# Patient Record
Sex: MALE | Race: WHITE | HISPANIC OR LATINO | Employment: STUDENT | URBAN - METROPOLITAN AREA
[De-identification: names, ages, dates, MRNs, and addresses within clinical notes are randomized per-mention and may not be internally consistent; named-entity substitution may affect disease eponyms.]

---
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
## 2021-03-31 ENCOUNTER — TELEPHONE (OUTPATIENT)
Dept: PEDIATRIC CARDIOLOGY | Facility: CLINIC | Age: 21
End: 2021-03-31

## 2021-03-31 NOTE — TELEPHONE ENCOUNTER
Spoke with Sean and advised that if Martín is doing well and does not have chest pain or shortness of breath, he can stay at home.  If Martín has shortness of breath or chest pain, per Dr. Sharma, they should be evaluated with echo and ekg.

## 2021-05-20 DIAGNOSIS — Q23.81 BICUSPID AORTIC VALVE: ICD-10-CM

## 2021-05-20 DIAGNOSIS — Z95.2 S/P AORTIC VALVE REPLACEMENT: Primary | ICD-10-CM

## 2021-06-03 ENCOUNTER — HOSPITAL ENCOUNTER (OUTPATIENT)
Dept: CARDIOLOGY | Facility: CLINIC | Age: 21
End: 2021-06-03
Attending: PEDIATRICS
Payer: COMMERCIAL

## 2021-06-03 ENCOUNTER — OFFICE VISIT (OUTPATIENT)
Dept: PEDIATRIC CARDIOLOGY | Facility: CLINIC | Age: 21
End: 2021-06-03
Attending: PEDIATRICS
Payer: COMMERCIAL

## 2021-06-03 VITALS
BODY MASS INDEX: 34.95 KG/M2 | SYSTOLIC BLOOD PRESSURE: 138 MMHG | WEIGHT: 281.09 LBS | RESPIRATION RATE: 16 BRPM | HEIGHT: 75 IN | OXYGEN SATURATION: 99 % | DIASTOLIC BLOOD PRESSURE: 80 MMHG | HEART RATE: 80 BPM

## 2021-06-03 DIAGNOSIS — Z95.2 S/P AORTIC VALVE REPLACEMENT: Primary | ICD-10-CM

## 2021-06-03 DIAGNOSIS — Z95.2 S/P AORTIC VALVE REPLACEMENT: ICD-10-CM

## 2021-06-03 DIAGNOSIS — Q23.81 BICUSPID AORTIC VALVE: ICD-10-CM

## 2021-06-03 PROCEDURE — 93320 DOPPLER ECHO COMPLETE: CPT | Mod: 26 | Performed by: PEDIATRICS

## 2021-06-03 PROCEDURE — G0463 HOSPITAL OUTPT CLINIC VISIT: HCPCS | Mod: 25

## 2021-06-03 PROCEDURE — 93320 DOPPLER ECHO COMPLETE: CPT

## 2021-06-03 PROCEDURE — 93325 DOPPLER ECHO COLOR FLOW MAPG: CPT | Mod: 26 | Performed by: PEDIATRICS

## 2021-06-03 PROCEDURE — 93325 DOPPLER ECHO COLOR FLOW MAPG: CPT

## 2021-06-03 PROCEDURE — 93303 ECHO TRANSTHORACIC: CPT | Mod: 26 | Performed by: PEDIATRICS

## 2021-06-03 PROCEDURE — 99213 OFFICE O/P EST LOW 20 MIN: CPT | Mod: 25 | Performed by: PEDIATRICS

## 2021-06-03 RX ORDER — LOSARTAN POTASSIUM 25 MG/1
25 TABLET ORAL DAILY
Qty: 30 TABLET | Refills: 11 | Status: SHIPPED | OUTPATIENT
Start: 2021-06-03 | End: 2022-06-13

## 2021-06-03 ASSESSMENT — MIFFLIN-ST. JEOR: SCORE: 2372.5

## 2021-06-03 NOTE — PROGRESS NOTES
Pediatric Cardiology Visit    Patient:  Martín Spencer MRN:  [id number]   YOB: 2000 Age:  20 year old   Date of Visit:  Juvenal 3, 2021 PCP:  Kristina Richmond MD     Dear Kristina Subramanian MD:    We saw Martín Spencer at the [facility] on Juvenal 3, 2021 for follow-up of a bicuspid aortic valve s/p recent surgical repair.  As you know, Martín was first referred to us in 2014 for evaluation of a murmur at which time he was diagnosed with a bicuspid aortic valve with mild regurgitation for which he was started on Lisinopril.  In the interim, Martín has continued to do well without any cardiac symptoms, however he had progressive aortic insufficiency and development of left ventricular enlargement, and so decision was made to proceed with aortic valve repair vs. Ross procedure.     On 5/14/2020 he had surgery with Dr. Griselli and had aortic valve repair and aortic root replacement.  He did very well postoperatively and was discharged on 5/17/20. He denies palpitations, shortness of breath or syncope.  He did have covid in April with mild symptoms but has recovered completely.  He has had no fevers, incision healed well with no erythema or drainage.  A comprehensive review of system if otherwise normal.    Past medical history: Obesity, acanthosis nigricans.    Bicuspid aortic valve s/p repair with aortic root replacement (5/14/20, Dr. Griselli)    Current medications:  Aspirin 81mg daily  Losartan 25mg daily     Heis allergic to dilantin [phenytoin].    Family and social history: No history of acquired heart disease in the family. No sudden death. Martín has three siblings, one of whom has bicuspid aortic valve and aortic root dilation.  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 There have been no changes to  "his medical history or the family history.     Physical exam:  His height is 1.9 m (6' 2.8\") and weight is 127.5 kg (281 lb 1.4 oz). His blood pressure is 138/80 and his pulse is 80. His respiration is 16 and oxygen saturation is 99%.   His body mass index is 35.32 kg/m .  His body surface area is 2.59 meters squared.  Growth percentiles are 100 % for weight and 95 % for height.  Martín is  in no distress.  Lungs are clear with easy work of breathing.  Heart is regular with normal S1, physiologically split S2, 2/6 systolic ejection murmur in the RUSB with no diastolic murmur. Sternotomy and chest tube incisions are healing well, no erythema, suture at superior portion of sternal incision removed today.  Abdomen is soft without hepatomegaly.  Extremities are warm and well-perfused with normal and symmetric pulses.    Echocardiogram revealed:   The aortic valve is bicuspid. S/P The conjoined aortic cusp was released and a commissure narrowed. (5/14/2020). Mild (1+) aortic valve insufficiency. Mild aortic valve stenosis. The mean gradient across the aortic valve is 11 mmHg. No pericardial effusion. Normal left ventricular size and systolic function.    In summary, Martín is a 20 year old with a bicuspid aortic valve who developed severe aortic insufficiency and left ventricular enlargement, who is now after aortic valve repair and aortic root replacement (5/14/20) with excellent surgical result. His echocardiogram today shows trivial aortic insufficiency and mild aortic stenosis.     Recommendations today:  Echocardiogram stable today. Mild leakage of aortic valve.     Continue current losartan. Can discontinue aspirin.     Return to clinic in 1 year for routine followup.     Thank you for the opportunity to participate in Martín's care. Please do not hesitate to call with questions or concerns.      Diagnoses:   1. Bicuspid aortic valve        A) s/p aortic valve repair and aortic root replacement (5/14/20,  " Griselli)  2. Mild left ventricular enlargement - resolved postoperatively    Most sincerely,      Shirley Sharma MD   Pediatric Cardiology      CC  Patient Care Team:  Kristina Richmond MD as PCP - General Sharma, Shirley Vásquez MD as Assigned Pediatric Specialist Provider  Reema Frausto PA as Assigned Heart and Vascular Provider  Griselli, Massimo    Copy to patient  BRANDI YEE  [address] [facility] [address]

## 2021-06-03 NOTE — NURSING NOTE
"Chief Complaint   Patient presents with     RECHECK     s/p aortic valve replacement      Vitals:    06/03/21 0833   BP: 138/80   BP Location: Right arm   Patient Position: Chair   Cuff Size: Adult Large   Pulse: 80   Resp: 16   SpO2: 99%   Weight: 281 lb 1.4 oz (127.5 kg)   Height: 6' 2.8\" (190 cm)     Corina Fitzgerald LPN  Jenn 3, 2021  "

## 2021-06-03 NOTE — LETTER
6/3/2021      RE: Martín Spencer  [address]  [place] [address]       Pediatric Cardiology Visit    Patient:  Martín Spencer MRN:  [id number]   YOB: 2000 Age:  20 year old   Date of Visit:  Juvenal 3, 2021 PCP:  Kristina Richmond MD     Dear Kristina Subramanian MD:    We saw Martín Spencer at the [facility] on Juvenal 3, 2021 for follow-up of a bicuspid aortic valve s/p recent surgical repair.  As you know, Martín was first referred to us in 2014 for evaluation of a murmur at which time he was diagnosed with a bicuspid aortic valve with mild regurgitation for which he was started on Lisinopril.  In the interim, Martín has continued to do well without any cardiac symptoms, however he had progressive aortic insufficiency and development of left ventricular enlargement, and so decision was made to proceed with aortic valve repair vs. Ross procedure.     On 5/14/2020 he had surgery with Dr. Griselli and had aortic valve repair and aortic root replacement.  He did very well postoperatively and was discharged on 5/17/20. He denies palpitations, shortness of breath or syncope.  He did have covid in April with mild symptoms but has recovered completely.  He has had no fevers, incision healed well with no erythema or drainage.  A comprehensive review of system if otherwise normal.    Past medical history: Obesity, acanthosis nigricans.    Bicuspid aortic valve s/p repair with aortic root replacement (5/14/20, Dr. Griselli)    Current medications:  Aspirin 81mg daily  Losartan 25mg daily     Heis allergic to dilantin [phenytoin].    Family and social history: No history of acquired heart disease in the family. No sudden death. Martín has three siblings, one of whom has bicuspid aortic valve and aortic root dilation.  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 There have been no changes to his medical history or the family history.     Physical exam:  His height is 1.9 m (6' 2.8\") and weight is 127.5 kg (281 lb 1.4 oz). His blood pressure is 138/80 and his pulse is 80. His respiration is 16 and oxygen saturation is 99%.   His body mass index is 35.32 kg/m .  His body surface area is 2.59 meters squared.  Growth percentiles are 100 % for weight and 95 % for height.  Martín is  in no distress.  Lungs are clear with easy work of breathing.  Heart is regular with normal S1, physiologically split S2, 2/6 systolic ejection murmur in the RUSB with no diastolic murmur. Sternotomy and chest tube incisions are healing well, no erythema, suture at superior portion of sternal incision removed today.  Abdomen is soft without hepatomegaly.  Extremities are warm and well-perfused with normal and symmetric pulses.    Echocardiogram revealed:   The aortic valve is bicuspid. S/P The conjoined aortic cusp was released and a commissure narrowed. (5/14/2020). Mild (1+) aortic valve insufficiency. Mild aortic valve stenosis. The mean gradient across the aortic valve is 11 mmHg. No pericardial effusion. Normal left ventricular size and systolic function.    In summary, Martín is a 20 year old with a bicuspid aortic valve who developed severe aortic insufficiency and left ventricular enlargement, who is now after aortic valve repair and aortic root replacement (5/14/20) with excellent surgical result. His echocardiogram today shows trivial aortic insufficiency and mild aortic stenosis.     Recommendations today:  Echocardiogram stable today. Mild leakage of aortic valve.     Continue current losartan. Can discontinue aspirin.     Return to clinic in 1 year for routine followup.     Thank you for the opportunity to participate in Martín's care. Please do not hesitate to call with questions or concerns.      Diagnoses:   1. Bicuspid aortic " valve        A) s/p aortic valve repair and aortic root replacement (5/14/20, Dr. Griselli)  2. Mild left ventricular enlargement - resolved postoperatively    Most sincerely,      Shirely Sharma MD   Pediatric Cardiology      CC  Patient Care Team:  Kristina Richmond MD as PCP - General Sharma, Shirley Vásquez MD as Assigned Pediatric Specialist Provider  Reema Frausto PA as Assigned Heart and Vascular Provider  Griselli, Massimo    Copy to patient  BRANDI MCCLAINDO  [address]  [facility] [address]

## 2021-06-03 NOTE — PATIENT INSTRUCTIONS
[facility]  [address]  [address] [address]  [place] [facility] [id number] [phone]      Cardiology Clinic   RN Care Coordinators, Marlys Linton (Bre)  [phone]  Pediatric Call Center/Scheduling  [phone]    After Hours and Emergency Contact Number  [phone]  * Ask for the pediatric cardiologist on call         Prescription Renewals  The pharmacy must fax requests to [phone]  * Please allow 3-4 days for prescriptions to be authorized     1. Echocardiogram stable today. Mild leakage of aortic valve.   2. Continue current losartan. Can discontinue aspirin.   3. Return to clinic in 1 year for routine followup.

## 2021-06-04 ENCOUNTER — TELEPHONE (OUTPATIENT)
Dept: CARDIOLOGY | Facility: CLINIC | Age: 21
End: 2021-06-04

## 2021-06-04 DIAGNOSIS — Z95.2 S/P AORTIC VALVE REPLACEMENT: ICD-10-CM

## 2021-06-04 DIAGNOSIS — Q23.81 BICUSPID AORTIC VALVE: Primary | ICD-10-CM

## 2021-06-04 NOTE — TELEPHONE ENCOUNTER
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

## 2021-06-04 NOTE — TELEPHONE ENCOUNTER
Date: 6/4/2021    Time of Call: 11:44 AM     Diagnosis:  BAV     [ TORB ] Ordering provider: Dr. Rivera  Order: echo, labs, and EKG prior to appointment     Order received by: Theodore BRASHER     Follow-up/additional notes: Orders placed for WQ.

## 2021-06-10 ENCOUNTER — TELEPHONE (OUTPATIENT)
Dept: PEDIATRIC CARDIOLOGY | Facility: CLINIC | Age: 21
End: 2021-06-10

## 2021-06-10 NOTE — TELEPHONE ENCOUNTER
Patient called after being evaluated in urgent care. They prescribed indocin for him and he wanted Dr. Sharma's approval.     Reviewed chart and huddled with provider. We will have him hold indocin and trial 800mg advil every 8 hours. Follow up in one week.     Discussed with patient. He agrees with plan and requested visit.     Christine Linton, JOELN, RN

## 2021-06-22 ENCOUNTER — OFFICE VISIT (OUTPATIENT)
Dept: PEDIATRIC CARDIOLOGY | Facility: CLINIC | Age: 21
End: 2021-06-22
Attending: PEDIATRICS
Payer: COMMERCIAL

## 2021-06-22 VITALS
SYSTOLIC BLOOD PRESSURE: 124 MMHG | HEIGHT: 74 IN | RESPIRATION RATE: 16 BRPM | WEIGHT: 278.88 LBS | HEART RATE: 80 BPM | BODY MASS INDEX: 35.79 KG/M2 | OXYGEN SATURATION: 99 % | DIASTOLIC BLOOD PRESSURE: 79 MMHG

## 2021-06-22 DIAGNOSIS — I35.2 AORTIC VALVE STENOSIS WITH INSUFFICIENCY, ETIOLOGY OF CARDIAC VALVE DISEASE UNSPECIFIED: Primary | ICD-10-CM

## 2021-06-22 PROCEDURE — 99212 OFFICE O/P EST SF 10 MIN: CPT | Performed by: PEDIATRICS

## 2021-06-22 PROCEDURE — 93005 ELECTROCARDIOGRAM TRACING: CPT

## 2021-06-22 PROCEDURE — G0463 HOSPITAL OUTPT CLINIC VISIT: HCPCS | Mod: 25

## 2021-06-22 ASSESSMENT — MIFFLIN-ST. JEOR: SCORE: 2353.75

## 2021-06-22 NOTE — PATIENT INSTRUCTIONS
[facility]  [address]  [address] [address]  [place] [facility] [id number] [phone]      Cardiology Clinic   RN Care Coordinators, Marlys Linton (Bre)  [phone]  Pediatric Call Center/Scheduling  [phone]    After Hours and Emergency Contact Number  [phone]  * Ask for the pediatric cardiologist on call         Prescription Renewals  The pharmacy must fax requests to [phone]  * Please allow 3-4 days for prescriptions to be authorized     Pleuritis seems to have resolved. Can discontinue ibuprofen and use as needed.   Continue current losartan.   followup in 1 year for routine cardiology followup. Will transition care to adult congenital clinic since Dr. Sharma is leaving [facility].

## 2021-06-22 NOTE — PROGRESS NOTES
Pediatric Cardiology Visit    Patient:  Martín Spencer MRN:  [id number]   YOB: 2000 Age:  20 year old   Date of Visit:  Jun 22, 2021 PCP:  Kristina Richmond MD     Dear Kristina Subramanian MD:    We saw Martín Spencer at the [facility] on Jun 22, 2021 for follow-up of a bicuspid aortic valve s/p recent surgical repair.  As you know, Martín was first referred to us in 2014 for evaluation of a murmur at which time he was diagnosed with a bicuspid aortic valve with mild regurgitation for which he was started on Lisinopril.  In the interim, Martín has continued to do well without any cardiac symptoms, however he had progressive aortic insufficiency and development of left ventricular enlargement, and so decision was made to proceed with aortic valve repair vs. Ross procedure.     On 5/14/2020 he had surgery with Dr. Griselli and had aortic valve repair and aortic root replacement.  He did very well postoperatively and was discharged on 5/17/20. He denies palpitations, shortness of breath or syncope.  He did have covid in April 2021 with mild symptoms but has recovered completely.  He was here for followup on 6/3/21 and was doing well, echo stable. He presented to urgent care a week after that with chest pain, left shoulder pain, and shortness of breath, no cough, no fever, no rhinorhea. Evaluation there was overall negative but he was diagnosied with pleuritis/pericarditis and started on indocin which we changed to ibuprofen when he called.  He said he is takes the ibuprofen once or twice a day but not the three times a day prescribed. Despite this, his symptoms have mostly resolved.   A comprehensive review of system if otherwise normal.    Past medical history: Obesity, acanthosis nigricans.    Bicuspid aortic valve s/p repair with aortic root replacement (5/14/20, Dr. Griselli)    Current medications:  Losartan 25mg daily     Heis  "allergic to dilantin [phenytoin].    Family and social history: No history of acquired heart disease in the family. No sudden death. Martín has three siblings, one of whom has bicuspid aortic valve and aortic root dilation.  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 There have been no changes to his medical history or the family history.     Physical exam:  His height is 1.886 m (6' 2.25\") and weight is 126.5 kg (278 lb 14.1 oz). His blood pressure is 124/79 and his pulse is 80. His respiration is 16 and oxygen saturation is 99%.   His body mass index is 35.56 kg/m .  His body surface area is 2.57 meters squared.  Growth percentiles are 100 % for weight and 95 % for height.  Martín is  in no distress.  Lungs are clear with easy work of breathing.  Heart is regular with normal S1, physiologically split S2, 2/6 systolic ejection murmur in the RUSB with no diastolic murmur. Sternotomy and chest tube incisions are healed well, no erythema. He has no tenderness on palpation.  Abdomen is soft without hepatomegaly.  Extremities are warm and well-perfused with normal and symmetric pulses.    Echocardiogram from 6/3/21 revealed:     The aortic valve is bicuspid. S/P The conjoined aortic cusp was released and a commissure narrowed. (5/14/2020). Mild (1+) aortic valve insufficiency. Mild aortic valve stenosis. The mean gradient across the aortic valve is 11 mmHg. No pericardial effusion. Normal left ventricular size and systolic function.    In summary, Martín is a 20 year old with a bicuspid aortic valve who developed severe aortic insufficiency and left ventricular enlargement, who is now after aortic valve repair and aortic root replacement (5/14/20) with excellent surgical result. He had extra followup visit due to chest pain and concern for pleuritis/pericarditis at urgent care, symptoms now " resolved.     Recommendations today:  Pleuritis seems to have resolved. Can discontinue ibuprofen and use as needed.   Continue current losartan.   followup in 1 year for routine cardiology followup. Will transition care to adult congenital clinic since Dr. Sharma is leaving [facility].     Thank you for the opportunity to participate in Brandi's care. Please do not hesitate to call with questions or concerns.      Diagnoses:   1. Bicuspid aortic valve        A) s/p aortic valve repair and aortic root replacement (5/14/20, Dr. Griselli)  2. Mild left ventricular enlargement - resolved postoperatively    Most sincerely,      Shirley Sharma MD   Pediatric Cardiology      CC  Patient Care Team:  Kristina Richmond MD as PCP - General  Zachary, Shirley Vásquez MD as Assigned Pediatric Specialist Provider  Reema Frausto PA as Assigned Heart and Vascular Provider    Copy to patient  BRANDI YEE  [address]  [facility] [address]

## 2021-06-22 NOTE — LETTER
6/22/2021      RE: Martín Spencer  [address]  [facility] [address]       Pediatric Cardiology Visit    Patient:  Martín Spencer MRN:  [id number]   YOB: 2000 Age:  20 year old   Date of Visit:  Jun 22, 2021 PCP:  Kristina Richmond MD     Dear Kristina Subramanian MD:    We saw Martín Spencer at the [facility] on Jun 22, 2021 for follow-up of a bicuspid aortic valve s/p recent surgical repair.  As you know, Martín was first referred to us in 2014 for evaluation of a murmur at which time he was diagnosed with a bicuspid aortic valve with mild regurgitation for which he was started on Lisinopril.  In the interim, Martín has continued to do well without any cardiac symptoms, however he had progressive aortic insufficiency and development of left ventricular enlargement, and so decision was made to proceed with aortic valve repair vs. Ross procedure.     On 5/14/2020 he had surgery with Dr. Griselli and had aortic valve repair and aortic root replacement.  He did very well postoperatively and was discharged on 5/17/20. He denies palpitations, shortness of breath or syncope.  He did have covid in April 2021 with mild symptoms but has recovered completely.  He was here for followup on 6/3/21 and was doing well, echo stable. He presented to urgent care a week after that with chest pain, left shoulder pain, and shortness of breath, no cough, no fever, no rhinorhea. Evaluation there was overall negative but he was diagnosied with pleuritis/pericarditis and started on indocin which we changed to ibuprofen when he called.  He said he is takes the ibuprofen once or twice a day but not the three times a day prescribed. Despite this, his symptoms have mostly resolved.   A comprehensive review of system if otherwise normal.    Past medical history: Obesity, acanthosis nigricans.    Bicuspid aortic valve s/p repair with aortic root  "replacement (5/14/20, Dr. Griselli)    Current medications:  Losartan 25mg daily     Heis allergic to dilantin [phenytoin].    Family and social history: No history of acquired heart disease in the family. No sudden death. Martín has three siblings, one of whom has bicuspid aortic valve and aortic root dilation.  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 There have been no changes to his medical history or the family history.     Physical exam:  His height is 1.886 m (6' 2.25\") and weight is 126.5 kg (278 lb 14.1 oz). His blood pressure is 124/79 and his pulse is 80. His respiration is 16 and oxygen saturation is 99%.   His body mass index is 35.56 kg/m .  His body surface area is 2.57 meters squared.  Growth percentiles are 100 % for weight and 95 % for height.  Martín is  in no distress.  Lungs are clear with easy work of breathing.  Heart is regular with normal S1, physiologically split S2, 2/6 systolic ejection murmur in the RUSB with no diastolic murmur. Sternotomy and chest tube incisions are healed well, no erythema. He has no tenderness on palpation.  Abdomen is soft without hepatomegaly.  Extremities are warm and well-perfused with normal and symmetric pulses.    Echocardiogram from 6/3/21 revealed:     The aortic valve is bicuspid. S/P The conjoined aortic cusp was released and a commissure narrowed. (5/14/2020). Mild (1+) aortic valve insufficiency. Mild aortic valve stenosis. The mean gradient across the aortic valve is 11 mmHg. No pericardial effusion. Normal left ventricular size and systolic function.    In summary, Martín is a 20 year old with a bicuspid aortic valve who developed severe aortic insufficiency and left ventricular enlargement, who is now after aortic valve repair and aortic root replacement (5/14/20) with excellent surgical result. He had extra followup " visit due to chest pain and concern for pleuritis/pericarditis at urgent care, symptoms now resolved.     Recommendations today:  Pleuritis seems to have resolved. Can discontinue ibuprofen and use as needed.   Continue current losartan.   followup in 1 year for routine cardiology followup. Will transition care to adult congenital clinic since Dr. Sharma is leaving [facility].     Thank you for the opportunity to participate in Brandi's care. Please do not hesitate to call with questions or concerns.      Diagnoses:   1. Bicuspid aortic valve        A) s/p aortic valve repair and aortic root replacement (5/14/20, Dr. Griselli)  2. Mild left ventricular enlargement - resolved postoperatively    Most sincerely,      Shirley Sharma MD   Pediatric Cardiology      CC  Patient Care Team:  Kristina Richmond MD as PCP - General Sharma, Shirley Vásquez MD as Assigned Pediatric Specialist Provider  Reema Frausto PA as Assigned Heart and Vascular Provider    Copy to patient  BRANDI YEE  [address]  [facility] [address]

## 2021-06-22 NOTE — NURSING NOTE
"Chief Complaint   Patient presents with     RECHECK     Aortic insufficiency with aortic stenosis.     Vitals:    06/22/21 1028   BP: 124/79   BP Location: Right arm   Patient Position: Sitting   Cuff Size: Adult Regular   Pulse: 80   Resp: 16   SpO2: 99%   Weight: 278 lb 14.1 oz (126.5 kg)   Height: 6' 2.25\" (188.6 cm)     Xin Euceda LPN    "

## 2021-06-25 LAB — INTERPRETATION ECG - MUSE: NORMAL

## 2022-03-18 ENCOUNTER — OFFICE VISIT (OUTPATIENT)
Dept: PEDIATRIC CARDIOLOGY | Facility: CLINIC | Age: 22
End: 2022-03-18
Attending: PEDIATRICS
Payer: COMMERCIAL

## 2022-03-18 ENCOUNTER — HOSPITAL ENCOUNTER (OUTPATIENT)
Dept: CARDIOLOGY | Facility: CLINIC | Age: 22
Discharge: HOME | End: 2022-03-18
Attending: PEDIATRICS
Payer: COMMERCIAL

## 2022-03-18 VITALS
RESPIRATION RATE: 16 BRPM | HEIGHT: 74 IN | BODY MASS INDEX: 40.15 KG/M2 | SYSTOLIC BLOOD PRESSURE: 144 MMHG | HEART RATE: 95 BPM | DIASTOLIC BLOOD PRESSURE: 80 MMHG | OXYGEN SATURATION: 98 % | WEIGHT: 312.83 LBS

## 2022-03-18 DIAGNOSIS — I35.2 AORTIC VALVE STENOSIS WITH INSUFFICIENCY, ETIOLOGY OF CARDIAC VALVE DISEASE UNSPECIFIED: Primary | ICD-10-CM

## 2022-03-18 DIAGNOSIS — Q23.81 BICUSPID AORTIC VALVE: Primary | ICD-10-CM

## 2022-03-18 DIAGNOSIS — I35.2 AORTIC VALVE STENOSIS WITH INSUFFICIENCY, ETIOLOGY OF CARDIAC VALVE DISEASE UNSPECIFIED: ICD-10-CM

## 2022-03-18 PROCEDURE — G0463 HOSPITAL OUTPT CLINIC VISIT: HCPCS | Mod: 25

## 2022-03-18 PROCEDURE — 99213 OFFICE O/P EST LOW 20 MIN: CPT | Mod: 25 | Performed by: PEDIATRICS

## 2022-03-18 PROCEDURE — 93325 DOPPLER ECHO COLOR FLOW MAPG: CPT

## 2022-03-18 PROCEDURE — 93325 DOPPLER ECHO COLOR FLOW MAPG: CPT | Mod: 26 | Performed by: PEDIATRICS

## 2022-03-18 PROCEDURE — 93320 DOPPLER ECHO COMPLETE: CPT | Mod: 26 | Performed by: PEDIATRICS

## 2022-03-18 PROCEDURE — 93303 ECHO TRANSTHORACIC: CPT | Mod: 26 | Performed by: PEDIATRICS

## 2022-03-18 ASSESSMENT — PAIN SCALES - GENERAL: PAINLEVEL: NO PAIN (0)

## 2022-03-18 NOTE — PATIENT INSTRUCTIONS
[facility]  [address]  [address]  [place] [facility] [phone]      Cardiology Clinic   RN Care Coordinators, Christine Amezcua (Bre) or Kristie Cespedes  [phone]  Pediatric Call Center/Scheduling  [phone]    After Hours and Emergency Contact Number  [phone]  * Ask for the pediatric cardiologist on call         Prescription Renewals  The pharmacy must fax requests to [phone]  * Please allow 3-4 days for prescriptions to be authorized     Your feedback is very important to us. If you receive a survey about your visit today, please take the time to fill this out so we can continue to improve.

## 2022-03-18 NOTE — LETTER
"  3/18/2022      RE: Martín Spencer  [address] [facility] [address]       PEDS Cardiac Letter    Kristina Richmond MD  [address] [facility] [address]     PATIENT: Martín Spencer  :         2000   EVANGELISTA:         23    Dear Aner:     Martín is 21 year old and was seen at the [facility] on 2022. He is seen for follow-up of a bicuspid aortic valve. He was first seen by cardiology in  for evaluation of a murmur and was found to have a bicuspid aortic valve. He subsequently underwent aortic valve repair and aortic root replacement by Dr. Massimo Griselli on 2020. He has mild aortic valve insufficiency. He complains of sharp intermittent chest pain that has improved with ibuprofen. He has not experienced, palpitations, dyspnea, dizziness, syncope. There are no changes to his medical or family history over the past year. A comprehensive review of systems was performed and was otherwise normal.     On physical examination his height was 1.881 m (6' 2.06\") and his weight was 141.9 kg (312 lb 13.3 oz). His heart rate was 95 and respirations 16 per minute. The blood pressure in his right arm was 144/80. He was acyanotic, warm and well perfused. He was alert, cooperative, and in no distress. His lungs were clear to auscultation without respiratory distress. He had a regular rhythm with a 2/6 systolic ejection murmur in the RUSB with no diastolic murmur. The second heart sound was physiologically split with a normal pulmonary component. There was no organomegaly or abdominal tenderness. Peripheral pulses were 2+ and equal in all extremities. There was no clubbing or edema.    An echocardiogram performed today that I personally reviewed and explained to Martín showed mild (1+) aortic valve insufficiency and mild aortic valve stenosis with a mean gradient of 9 mmHg. There is no aortic root enlargement.    Martín has a bicuspid aortic valve with " aortic valve repair and aortic root replacement. He has mild aortic valve insufficiency and mild aortic valve stenosis. The echocardiogram today remains unchanged from previous. I stressed the importance of good dental hygiene to minimize the risk of infective endocarditis. I gave him contact information for our colleague, Dr. Julio Rivera with adult congenital cardiology for follow up in 1 year.     Thank you very much for your confidence in allowing me to participate in Martín's care.  If you have any questions or concerns, please don't hesitate to contact me.    Sincerely,    Vinayak Ibanez MD  Pediatric Cardiology Fellow  [facility]  Pager: [phone]    Reinaldo Roa M.D.   Pediatric Cardiology   [facility]  Pediatric Specialty Clinic  [phone]    Note: Chart documentation done in part with Dragon Voice Recognition software. Although reviewed after completion, some word and grammatical errors may remain.     I took the history, examined the patient, formulated the plan and discussed it with the fellow and family. - JLB      Reinaldo Roa MD

## 2022-03-18 NOTE — PROGRESS NOTES
"PEDS Cardiac Letter    Kristina Richmond MD  [address] [place] [id number]     PATIENT: Martín Spencer  :         2000   EVANGELISTA:         23    Dear Kristina:     Martín is 21 year old and was seen at the [facility] on 2022. He is seen for follow-up of a bicuspid aortic valve. He was first seen by cardiology in  for evaluation of a murmur and was found to have a bicuspid aortic valve. He subsequently underwent aortic valve repair and aortic root replacement by Dr. Massimo Griselli on 2020. He has mild aortic valve insufficiency. He complains of sharp intermittent chest pain that has improved with ibuprofen. He has not experienced, palpitations, dyspnea, dizziness, syncope. There are no changes to his medical or family history over the past year. A comprehensive review of systems was performed and was otherwise normal.     On physical examination his height was 1.881 m (6' 2.06\") and his weight was 141.9 kg (312 lb 13.3 oz). His heart rate was 95 and respirations 16 per minute. The blood pressure in his right arm was 144/80. He was acyanotic, warm and well perfused. He was alert, cooperative, and in no distress. His lungs were clear to auscultation without respiratory distress. He had a regular rhythm with a 2/6 systolic ejection murmur in the RUSB with no diastolic murmur. The second heart sound was physiologically split with a normal pulmonary component. There was no organomegaly or abdominal tenderness. Peripheral pulses were 2+ and equal in all extremities. There was no clubbing or edema.    An echocardiogram performed today that I personally reviewed and explained to Martín showed mild (1+) aortic valve insufficiency and mild aortic valve stenosis with a mean gradient of 9 mmHg. There is no aortic root enlargement.    Martín has a bicuspid aortic valve with aortic valve repair and aortic root replacement. He has mild aortic valve insufficiency and " mild aortic valve stenosis. The echocardiogram today remains unchanged from previous. I stressed the importance of good dental hygiene to minimize the risk of infective endocarditis. I gave him contact information for our colleague, Dr. Julio Rivera with adult congenital cardiology for follow up in 1 year.     Thank you very much for your confidence in allowing me to participate in Martín's care.  If you have any questions or concerns, please don't hesitate to contact me.    Sincerely,    Vinayak Ibanez MD  Pediatric Cardiology Fellow  [facility]  Pager: [phone]    Reinaldo Roa M.D.   Pediatric Cardiology   [facility]  Pediatric Specialty Clinic  [phone]    Note: Chart documentation done in part with Dragon Voice Recognition software. Although reviewed after completion, some word and grammatical errors may remain.     I took the history, examined the patient, formulated the plan and discussed it with the fellow and family. - DYLON

## 2022-03-18 NOTE — NURSING NOTE
"Chief Complaint   Patient presents with     Heart Problem     Pericarditis       BP (!) 144/80 (BP Location: Right arm, Patient Position: Sitting, Cuff Size: Adult Large)   Pulse 95   Resp 16   Ht 6' 2.06\" (188.1 cm)   Wt 312 lb 13.3 oz (141.9 kg)   SpO2 98%   BMI 40.11 kg/m      Mitzy Yañez [facility]  March 18, 2022  "

## 2022-06-13 DIAGNOSIS — Z95.2 S/P AORTIC VALVE REPLACEMENT: ICD-10-CM

## 2022-06-13 RX ORDER — LOSARTAN POTASSIUM 25 MG/1
25 TABLET ORAL DAILY
Qty: 30 TABLET | Refills: 11 | Status: SHIPPED | OUTPATIENT
Start: 2022-06-13

## 2022-06-13 NOTE — TELEPHONE ENCOUNTER
Refill sent, [facility] to [facility] scheduling to reach out to pt to schedule annual follow up.    Kristie Cespedes RN BSN  Pediatric Cardiology  [phone]

## 2022-06-13 NOTE — TELEPHONE ENCOUNTER
Medication Requested: LOSARTAN 25MG TABLES  Directions:TAKE ONE TABLET BY MOUTH DAILY  Quantity:30   Last Office Visit: 3/18/22   Next Appointment Scheduled for: NOLVIA  Last refill: 1/20/22  Sent To:  RN or Provider

## 2022-06-16 ENCOUNTER — CARE COORDINATION (OUTPATIENT)
Dept: CARDIOLOGY | Facility: CLINIC | Age: 22
End: 2022-06-16
Payer: COMMERCIAL

## 2022-06-16 DIAGNOSIS — Z95.2 S/P AORTIC VALVE REPLACEMENT: ICD-10-CM

## 2022-06-16 DIAGNOSIS — I35.2 AORTIC VALVE STENOSIS WITH INSUFFICIENCY, ETIOLOGY OF CARDIAC VALVE DISEASE UNSPECIFIED: Primary | ICD-10-CM

## 2022-06-16 DIAGNOSIS — Q23.81 BICUSPID AORTIC VALVE: ICD-10-CM

## 2022-06-16 NOTE — PROGRESS NOTES
"Date: 6/16/2022    Time of Call: 2:00 PM     Diagnosis:  BAV     [ TORB ] Ordering provider: Smiley Myers MD  Order: echo and labs, establish care with [facility] clinic     Order received by: Mitzy Castro RN     Follow-up/additional notes:   Due for follow up march 2023, referral from Dr Crispin Resendiz is 21 year old and was seen at the [facility] on 03/18/2022. He is seen for follow-up of a bicuspid aortic valve. He was first seen by cardiology in 2014 for evaluation of a murmur and was found to have a bicuspid aortic valve. He subsequently underwent aortic valve repair and aortic root replacement by Dr. Massimo Griselli on 5/14/2020. He has mild aortic valve insufficiency. He complains of sharp intermittent chest pain that has improved with ibuprofen. He has not experienced, palpitations, dyspnea, dizziness, syncope. There are no changes to his medical or family history over the past year. A comprehensive review of systems was performed and was otherwise normal.      On physical examination his height was 1.881 m (6' 2.06\") and his weight was 141.9 kg (312 lb 13.3 oz). His heart rate was 95 and respirations 16 per minute. The blood pressure in his right arm was 144/80. He was acyanotic, warm and well perfused. He was alert, cooperative, and in no distress. His lungs were clear to auscultation without respiratory distress. He had a regular rhythm with a 2/6 systolic ejection murmur in the RUSB with no diastolic murmur. The second heart sound was physiologically split with a normal pulmonary component. There was no organomegaly or abdominal tenderness. Peripheral pulses were 2+ and equal in all extremities. There was no clubbing or edema.     An echocardiogram performed today that I personally reviewed and explained to Martín showed mild (1+) aortic valve insufficiency and mild aortic valve stenosis with a mean gradient of 9 mmHg. There is no aortic root enlargement.     Martín has a " bicuspid aortic valve with aortic valve repair and aortic root replacement. He has mild aortic valve insufficiency and mild aortic valve stenosis. The echocardiogram today remains unchanged from previous. I stressed the importance of good dental hygiene to minimize the risk of infective endocarditis. I gave him contact information for our colleague, Dr. Julio Rivera with adult congenital cardiology for follow up in 1 year

## 2022-09-19 ENCOUNTER — TELEPHONE (OUTPATIENT)
Dept: CARDIOLOGY | Facility: CLINIC | Age: 22
End: 2022-09-19

## 2022-09-19 NOTE — TELEPHONE ENCOUNTER
1st attempt to reach out to pt to schedule and establish care with march with fasting labs and mary echo prior. No answer, LVM

## 2022-09-19 NOTE — LETTER
October 25, 2022      Martín Spencer  [address]  [facility] [address]              Dear Martín,        Our records indicate that it is time for you to schedule your return visit with the [facility] in the CARDIOVASCULAR CONGENITAL CLINIC at the [facility] ([facility]).      To make your appointment, please call: [phone], Monday - Friday, 8 A.M. - 4:30 P.M (Central Time Zone).    Please check with your insurance company about your benefits.  Some insurance plans provide different coverage levels for services at [facility] hospital-based clinics.     We look forward to hearing from you.    Sincerely,    Juanita STEWART.   Clinic Coordinator  [facility]  Office: [phone]  Fax: [phone]

## 2022-11-25 NOTE — TELEPHONE ENCOUNTER
DIAGNOSIS: Aortic valve stenosis with insufficiency, etiology of cardiac valve disease unsp...  Bicuspid aortic valve [Q23.1]  S/P aortic valve replacement [Z95.2]   DATE OF APPOINTMENT: 3.14.23   NOTES STATUS DETAILS   OFFICE VISIT NOTES with cardiologist Internal 3.18.22 Dr regina Roa, [facility] Cardio  6.22.21 Dr Shirley Sharma, [facility] Cardio  6.3.21  More in [facility]   OPERATIVE REPORTS  Internal 5.14.20 Median Sternotomy, Transesophagel Echocardiogram by Dr. Roa, REPAIR, AORTIC VALVE on Cardiopulmonary Bypass   LABS   Care Everywhere/Internal    SCANS     EKG/MONITORS   Received 11.19.21 ekg, PN  6.22.21 Internal   IMAGES      ECHO   Internal      Received 3.18.22, 6.3.21, 10.8.20, 6.4.20, 5.22.20, 5.14.20 More in Epic    11.19.21 PN   CT/CTA (head, neck, chest, abdomen, pelvis) Internal    XRAY (chest) Received 6.9.21 chest,      Action 11.29.22 10:17 AM ANDI   Action Taken Faxed request to  [phone], [phone]     Action 12.27.22 8:32 AM ANDI   Action Taken Faxed another request       Action 02/13/23 JL   Action Taken Faxed request of images to      Action 02/22/23 CH   Action Taken Faxed Images request to

## 2023-01-21 ENCOUNTER — APPOINTMENT (OUTPATIENT)
Dept: GENERAL RADIOLOGY | Facility: CLINIC | Age: 23
End: 2023-01-21
Attending: EMERGENCY MEDICINE
Payer: COMMERCIAL

## 2023-01-21 ENCOUNTER — HOSPITAL ENCOUNTER (EMERGENCY)
Facility: CLINIC | Age: 23
Discharge: HOME | End: 2023-01-21
Attending: EMERGENCY MEDICINE | Admitting: EMERGENCY MEDICINE
Payer: COMMERCIAL

## 2023-01-21 VITALS
OXYGEN SATURATION: 98 % | HEART RATE: 86 BPM | DIASTOLIC BLOOD PRESSURE: 79 MMHG | RESPIRATION RATE: 22 BRPM | SYSTOLIC BLOOD PRESSURE: 123 MMHG | TEMPERATURE: 97.9 F

## 2023-01-21 DIAGNOSIS — Z98.890 HISTORY OF HEART SURGERY: ICD-10-CM

## 2023-01-21 DIAGNOSIS — R07.9 RECURRENT CHEST PAIN: ICD-10-CM

## 2023-01-21 DIAGNOSIS — I31.9 PERICARDITIS, UNSPECIFIED CHRONICITY, UNSPECIFIED TYPE: ICD-10-CM

## 2023-01-21 LAB
ALBUMIN SERPL BCG-MCNC: 4.4 G/DL (ref 3.5–5.2)
ALP SERPL-CCNC: 113 U/L (ref 40–129)
ALT SERPL W P-5'-P-CCNC: 52 U/L (ref 10–50)
ANION GAP SERPL CALCULATED.3IONS-SCNC: 11 MMOL/L (ref 7–15)
AST SERPL W P-5'-P-CCNC: 29 U/L (ref 10–50)
BASOPHILS # BLD AUTO: 0.1 10E3/UL (ref 0–0.2)
BASOPHILS NFR BLD AUTO: 1 %
BILIRUB SERPL-MCNC: 0.6 MG/DL
BUN SERPL-MCNC: 10.4 MG/DL (ref 6–20)
CALCIUM SERPL-MCNC: 9.2 MG/DL (ref 8.6–10)
CHLORIDE SERPL-SCNC: 103 MMOL/L (ref 98–107)
CREAT SERPL-MCNC: 0.64 MG/DL (ref 0.67–1.17)
CRP SERPL-MCNC: 16.05 MG/L
DEPRECATED HCO3 PLAS-SCNC: 25 MMOL/L (ref 22–29)
EOSINOPHIL # BLD AUTO: 0.1 10E3/UL (ref 0–0.7)
EOSINOPHIL NFR BLD AUTO: 1 %
ERYTHROCYTE [DISTWIDTH] IN BLOOD BY AUTOMATED COUNT: 12.1 % (ref 10–15)
ERYTHROCYTE [SEDIMENTATION RATE] IN BLOOD BY WESTERGREN METHOD: 21 MM/HR (ref 0–15)
GFR SERPL CREATININE-BSD FRML MDRD: >90 ML/MIN/1.73M2
GLUCOSE SERPL-MCNC: 88 MG/DL (ref 70–99)
HCT VFR BLD AUTO: 43.6 % (ref 40–53)
HGB BLD-MCNC: 14.1 G/DL (ref 13.3–17.7)
HOLD SPECIMEN: NORMAL
IMM GRANULOCYTES # BLD: 0 10E3/UL
IMM GRANULOCYTES NFR BLD: 0 %
LIPASE SERPL-CCNC: 12 U/L (ref 13–60)
LYMPHOCYTES # BLD AUTO: 2.5 10E3/UL (ref 0.8–5.3)
LYMPHOCYTES NFR BLD AUTO: 27 %
MCH RBC QN AUTO: 28.8 PG (ref 26.5–33)
MCHC RBC AUTO-ENTMCNC: 32.3 G/DL (ref 31.5–36.5)
MCV RBC AUTO: 89 FL (ref 78–100)
MONOCYTES # BLD AUTO: 0.7 10E3/UL (ref 0–1.3)
MONOCYTES NFR BLD AUTO: 8 %
NEUTROPHILS # BLD AUTO: 5.7 10E3/UL (ref 1.6–8.3)
NEUTROPHILS NFR BLD AUTO: 63 %
NRBC # BLD AUTO: 0 10E3/UL
NRBC BLD AUTO-RTO: 0 /100
PLATELET # BLD AUTO: 311 10E3/UL (ref 150–450)
POTASSIUM SERPL-SCNC: 4.4 MMOL/L (ref 3.4–5.3)
PROT SERPL-MCNC: 7.7 G/DL (ref 6.4–8.3)
RBC # BLD AUTO: 4.9 10E6/UL (ref 4.4–5.9)
SODIUM SERPL-SCNC: 139 MMOL/L (ref 136–145)
TROPONIN T SERPL HS-MCNC: <6 NG/L
WBC # BLD AUTO: 9.1 10E3/UL (ref 4–11)

## 2023-01-21 PROCEDURE — 93005 ELECTROCARDIOGRAM TRACING: CPT

## 2023-01-21 PROCEDURE — 99285 EMERGENCY DEPT VISIT HI MDM: CPT | Mod: 25

## 2023-01-21 PROCEDURE — 71046 X-RAY EXAM CHEST 2 VIEWS: CPT

## 2023-01-21 PROCEDURE — 85652 RBC SED RATE AUTOMATED: CPT | Performed by: EMERGENCY MEDICINE

## 2023-01-21 PROCEDURE — 84484 ASSAY OF TROPONIN QUANT: CPT | Performed by: EMERGENCY MEDICINE

## 2023-01-21 PROCEDURE — 36415 COLL VENOUS BLD VENIPUNCTURE: CPT | Performed by: EMERGENCY MEDICINE

## 2023-01-21 PROCEDURE — 85025 COMPLETE CBC W/AUTO DIFF WBC: CPT | Performed by: EMERGENCY MEDICINE

## 2023-01-21 PROCEDURE — 83690 ASSAY OF LIPASE: CPT | Performed by: EMERGENCY MEDICINE

## 2023-01-21 PROCEDURE — 86140 C-REACTIVE PROTEIN: CPT | Performed by: EMERGENCY MEDICINE

## 2023-01-21 PROCEDURE — 80053 COMPREHEN METABOLIC PANEL: CPT | Performed by: EMERGENCY MEDICINE

## 2023-01-21 ASSESSMENT — ENCOUNTER SYMPTOMS
FEVER: 0
NECK PAIN: 1
VOMITING: 0
BACK PAIN: 1
NAUSEA: 0
DIARRHEA: 0
SHORTNESS OF BREATH: 1

## 2023-01-21 ASSESSMENT — ACTIVITIES OF DAILY LIVING (ADL): ADLS_ACUITY_SCORE: 37

## 2023-01-21 NOTE — ED TRIAGE NOTES
Pt here for left sided chest pain that radiates to the left side of his neck and left shoulder blade, pain increases with deep breathing and activity. Pt has hx of repair of his bicuspid valve in 2020 and multiple episodes of pericarditis. Pain started last Sunday, he has been taking ibuprofen , pain has been increasing.      Triage Assessment     Row Name 01/21/23 1217       Triage Assessment (Adult)    Airway WDL WDL       Respiratory WDL    Respiratory WDL X;rhythm/pattern    Rhythm/Pattern, Respiratory shortness of breath       Skin Circulation/Temperature WDL    Skin Circulation/Temperature WDL WDL       Cardiac WDL    Cardiac WDL X;chest pain       Peripheral/Neurovascular WDL    Peripheral Neurovascular WDL WDL       Cognitive/Neuro/Behavioral WDL    Cognitive/Neuro/Behavioral WDL WDL

## 2023-01-21 NOTE — ED PROVIDER NOTES
History     Chief Complaint:  Chest Pain     The history is provided by the patient.      Martín Spencer is a 22 year old male who presents with chest pain. The patient reports onset of chest pain 6 days ago that has been worsening. States that before the onset of this chest pain, he had a slightly productive cough for a few days. Notes that since the onset of his chest pain, he has been experiencing shortness of breath, neck pain, and shoulder pain. Adds that his pain is worse when laying down or bending over. Reports that he has been taking 800 mg of ibuprofen about every 8 hours to manage his chest pain and last took some yesterday. States that he gets symptoms similar to these once a month and has been diagnosed with pericarditis on at least 2 occasions in the past. Denies fever, nausea, vomiting, and diarrhea.  He states that when an urgent care team put him on ibuprofen and colchicine, his cardiologist told him subsequently to not to use the colchicine but stick with ibuprofen alone.  He does not think he needs any new medications, but would like to be checked to make sure there is nothing else going on.  He states his current episode is fully compatible with his multiple prior episodes that have been attributed to pericarditis.  No rash.  No recent antibiotics.  No syncope.  Symptoms are nonexertional.    Independent Historian: Patient's mother     Review of External Notes: I personally performed electronic chart review, noting that he saw Dr. Banuelos, cardiologist, in clinic in November 2021.  He had undergone repair of his aortic valve in May 2020.    ROS:  Review of Systems   Constitutional: Negative for fever.   Respiratory: Positive for shortness of breath.    Cardiovascular: Positive for chest pain.   Gastrointestinal: Negative for diarrhea, nausea and vomiting.   Musculoskeletal: Positive for back pain (shoulder) and neck pain.   All other systems reviewed and are negative.    Allergies:  Dilantin  [Phenytoin]   Barbiturates      Medications:  Losartan       Past Medical History:    Acanthosis nigricans   Aortic insufficiency  Aortic stenosis   Bicuspid aortic valve  Elevated liver enzymes  Obesity  Acute idiopathic pericarditis  DRESS syndrome    Past Surgical History:    Circumcision  Repair aortic valve, on cardiopulmonary bypass     Family History:    Bicuspid aortic valve    Social History:  Presents with mother  Presents via private vehicle   Works at [employer]    Physical Exam     Patient Vitals for the past 24 hrs:   BP Temp Temp src Pulse Resp SpO2   01/21/23 1403 123/79 -- -- 86 -- --   01/21/23 1348 -- -- -- 76 -- 98 %   01/21/23 1245 -- -- -- 89 -- --   01/21/23 1230 137/84 -- -- -- -- --   01/21/23 1220 -- -- -- -- 22 --   01/21/23 1215 (!) 147/80 97.9  F (36.6  C) Temporal 79 -- 99 %      Physical Exam  General: Male sitting upright in room 15, mother at bedside  HENT: mucous membranes moist  CV: rate as above, regular rhythm, no lower extremity edema, no JVD, palpable symmetric radial pulses, no murmur audible  Resp: normal effort, speaks in full phrases, no stridor, no cough observed  GI: abdomen soft and nontender, no guarding, negative Lowe's sign  MSK: no bony tenderness to chest  Skin: appropriately warm and dry, no erythema or vesicles to chest wall, slightly hypertrophic sternotomy scar  Neuro: alert, clear speech, oriented  Psych: cooperative, pleasant    Emergency Department Course   ECG  ECG taken at 1359, ECG read at 1404  Normal sinus rhythm   Rate 79 bpm. NM interval 160 ms. QRS duration 106 ms. QT/QTc 362/416 ms. P-R-T axes 22 26 26.     Imaging:  XR Chest 2 Views   Final Result   IMPRESSION: Sternotomy wires. The lungs and pleural spaces are clear. No change from previous.         Report per radiology    Laboratory:  Labs Ordered and Resulted from Time of ED Arrival to Time of ED Departure   COMPREHENSIVE METABOLIC PANEL - Abnormal       Result Value    Sodium 139      Potassium  4.4      Chloride 103      Carbon Dioxide (CO2) 25      Anion Gap 11      Urea Nitrogen 10.4      Creatinine 0.64 (*)     Calcium 9.2      Glucose 88      Alkaline Phosphatase 113      AST 29      ALT 52 (*)     Protein Total 7.7      Albumin 4.4      Bilirubin Total 0.6      GFR Estimate >90     CRP INFLAMMATION - Abnormal    CRP Inflammation 16.05 (*)    ERYTHROCYTE SEDIMENTATION RATE AUTO - Abnormal    Erythrocyte Sedimentation Rate 21 (*)    LIPASE - Abnormal    Lipase 12 (*)    TROPONIN T, HIGH SENSITIVITY - Normal    Troponin T, High Sensitivity <6     CBC WITH PLATELETS AND DIFFERENTIAL    WBC Count 9.1      RBC Count 4.90      Hemoglobin 14.1      Hematocrit 43.6      MCV 89      MCH 28.8      MCHC 32.3      RDW 12.1      Platelet Count 311      % Neutrophils 63      % Lymphocytes 27      % Monocytes 8      % Eosinophils 1      % Basophils 1      % Immature Granulocytes 0      NRBCs per 100 WBC 0      Absolute Neutrophils 5.7      Absolute Lymphocytes 2.5      Absolute Monocytes 0.7      Absolute Eosinophils 0.1      Absolute Basophils 0.1      Absolute Immature Granulocytes 0.0      Absolute NRBCs 0.0        Emergency Department Course & Assessments:     Interventions:  Medications - No data to display     Consultations/Discussion of Management or Tests:  ED Course as of 01/21/23 1419   Sat Jan 21, 2023   1243 I obtained initial history and examined the patient as noted above.    1313 I rechecked and updated the patient.    1351 I went to check on the patient, but he was at x ray. I inquired about lack of EKG.   1408 I rechecked and updated the patient.      Disposition:  The patient was discharged to home.     Impression & Plan    Medical Decision Making:  He has a complex cardiac history, I certainly considered the possibility of acute valve disorder, though his vitals are good.  No evidence of volume overload to suggest CHF.  No murmur audible.  The frequently recurrent nature of his symptoms is not  suggestive of pulmonary embolism.  He does not have fever or leukocytosis though his inflammatory markers are somewhat elevated, and pericarditis does remain possible.  He wishes to continue NSAIDs which I think is reasonable, and based on his report of cardiology telling him not to use colchicine in the past, and his desire to avoid this still, I did not feel that immediate cardiology consultation was indicated.  No signs of myocarditis with an undetectable troponin after multiple days of symptoms.  Chest x-ray shows no infiltrate, no signs of pneumonia, no indication for antibiotics.  Do not think he needs emergent echocardiogram given current reassuring exam.  Diagnostic limitations reviewed with the patient, he was subsequently discharged home.  Return here for sudden worsening otherwise follow-up soon through clinic including with his cardiologist for ongoing care.    Diagnosis:    ICD-10-CM    1. Recurrent chest pain  R07.9       2. Pericarditis, unspecified chronicity, unspecified type  I31.9     possible      3. History of heart surgery  Z98.890          Scribe Disclosure:  I, Yudith Lima, am serving as a scribe at 12:43 PM on 1/21/2023 to document services personally performed by Terrence Victoria MD based on my observations and the provider's statements to me.    1/21/2023   Terrence Victoria MD Reitsema, Jeffrey Alan, MD  01/21/23 2017

## 2023-01-23 LAB
ATRIAL RATE - MUSE: 79 BPM
DIASTOLIC BLOOD PRESSURE - MUSE: NORMAL MMHG
INTERPRETATION ECG - MUSE: NORMAL
P AXIS - MUSE: 22 DEGREES
PR INTERVAL - MUSE: 160 MS
QRS DURATION - MUSE: 106 MS
QT - MUSE: 362 MS
QTC - MUSE: 415 MS
R AXIS - MUSE: 26 DEGREES
SYSTOLIC BLOOD PRESSURE - MUSE: NORMAL MMHG
T AXIS - MUSE: 29 DEGREES
VENTRICULAR RATE- MUSE: 79 BPM

## 2023-03-06 NOTE — TELEPHONE ENCOUNTER
Action Yara Regan on 3/6/2023   Action Taken Received call back from , Records from [facility].  Faxed PN imaging request.  PN Fax # [phone]

## 2023-03-14 ENCOUNTER — PRE VISIT (OUTPATIENT)
Dept: CARDIOLOGY | Facility: CLINIC | Age: 23
End: 2023-03-14

## 2023-03-14 ENCOUNTER — OFFICE VISIT (OUTPATIENT)
Dept: CARDIOLOGY | Facility: CLINIC | Age: 23
End: 2023-03-14
Attending: INTERNAL MEDICINE
Payer: COMMERCIAL

## 2023-03-14 ENCOUNTER — LAB (OUTPATIENT)
Dept: LAB | Facility: CLINIC | Age: 23
End: 2023-03-14
Attending: INTERNAL MEDICINE
Payer: COMMERCIAL

## 2023-03-14 VITALS
HEART RATE: 79 BPM | DIASTOLIC BLOOD PRESSURE: 83 MMHG | WEIGHT: 315 LBS | HEIGHT: 75 IN | SYSTOLIC BLOOD PRESSURE: 137 MMHG | OXYGEN SATURATION: 98 % | BODY MASS INDEX: 39.17 KG/M2

## 2023-03-14 DIAGNOSIS — E55.9 VITAMIN D DEFICIENCY: ICD-10-CM

## 2023-03-14 DIAGNOSIS — Z95.2 S/P AORTIC VALVE REPLACEMENT: ICD-10-CM

## 2023-03-14 DIAGNOSIS — Q23.81 BICUSPID AORTIC VALVE: ICD-10-CM

## 2023-03-14 DIAGNOSIS — E55.9 VITAMIN D DEFICIENCY: Primary | ICD-10-CM

## 2023-03-14 DIAGNOSIS — I35.2 AORTIC VALVE STENOSIS WITH INSUFFICIENCY, ETIOLOGY OF CARDIAC VALVE DISEASE UNSPECIFIED: ICD-10-CM

## 2023-03-14 DIAGNOSIS — E66.01 MORBID OBESITY (H): ICD-10-CM

## 2023-03-14 LAB
CHOLEST SERPL-MCNC: 171 MG/DL
CRP SERPL-MCNC: 3.01 MG/L
HDLC SERPL-MCNC: 45 MG/DL
LDLC SERPL CALC-MCNC: 105 MG/DL
NONHDLC SERPL-MCNC: 126 MG/DL
TRIGL SERPL-MCNC: 103 MG/DL
TSH SERPL DL<=0.005 MIU/L-ACNC: 2.85 UIU/ML (ref 0.3–4.2)

## 2023-03-14 PROCEDURE — 93320 DOPPLER ECHO COMPLETE: CPT | Mod: 26 | Performed by: PEDIATRICS

## 2023-03-14 PROCEDURE — 80061 LIPID PANEL: CPT | Performed by: PATHOLOGY

## 2023-03-14 PROCEDURE — 84443 ASSAY THYROID STIM HORMONE: CPT | Performed by: PATHOLOGY

## 2023-03-14 PROCEDURE — 93303 ECHO TRANSTHORACIC: CPT | Mod: 26 | Performed by: PEDIATRICS

## 2023-03-14 PROCEDURE — G0463 HOSPITAL OUTPT CLINIC VISIT: HCPCS | Performed by: INTERNAL MEDICINE

## 2023-03-14 PROCEDURE — 86140 C-REACTIVE PROTEIN: CPT | Performed by: PATHOLOGY

## 2023-03-14 PROCEDURE — 36415 COLL VENOUS BLD VENIPUNCTURE: CPT | Performed by: PATHOLOGY

## 2023-03-14 PROCEDURE — 99215 OFFICE O/P EST HI 40 MIN: CPT | Mod: 25 | Performed by: INTERNAL MEDICINE

## 2023-03-14 PROCEDURE — 93325 DOPPLER ECHO COLOR FLOW MAPG: CPT | Mod: 26 | Performed by: PEDIATRICS

## 2023-03-14 RX ORDER — CHOLECALCIFEROL (VITAMIN D3) 50 MCG
1 TABLET ORAL DAILY
Qty: 90 TABLET | Refills: 3 | Status: SHIPPED | OUTPATIENT
Start: 2023-03-14

## 2023-03-14 ASSESSMENT — PAIN SCALES - GENERAL: PAINLEVEL: NO PAIN (0)

## 2023-03-14 NOTE — PROGRESS NOTES
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  He is seen today for follow-up of a bicuspid aortic valve that underwent repair for severe AI 5/14/2020 by Tracee and Patricio.  He also had ascending aorta replacement with Gelweave graft size 28mm; his aorta was 3.1 CM on echo.   He was first seen by cardiology in 2014 for evaluation of a murmur and was found to have a bicuspid aortic valve.  His brother also has a bicuspid aortic valve but has not needed to have anything done (he has 2 other brothers that are negative and his 2 parents are negative).  He has not had imaging of his aorta since aorta.  He had an echo today and he just had mild AI.  Aorta is not enlarged.     He states that he had an episode of chest pain after trying to crack a friend's back in January.  Inflammatory markers were elevated.  He was told that he had pericarditis and just took ibuprofen.  He states this happened one other time. He has not had fevers or chills or other concerning symptoms.  His CBC WBC was not elevated. He has gained some weight and is going to work on diet and exercise.     PAST MEDICAL HISTORY:  Past Medical History:   Diagnosis Date     Acanthosis nigricans 12/16/2013     Aortic insufficiency and aortic stenosis 12/11/2014     Aseptic meningitis      Bicuspid aortic valve 12/11/2014     Elevated liver enzymes 12/16/2013     History of bladder problems     Mom reports that he was around 4 yo and this led to him having a circumcision      Obesity 12/16/2013       CURRENT MEDICATIONS:  Current Outpatient Medications   Medication Sig Dispense Refill     losartan (COZAAR) 25 MG tablet Take 1 tablet (25 mg) by mouth daily 30 tablet 11     Melatonin 5 MG TBDP Pt takes 12 mg every night for sleep (Patient not taking: Reported on 3/18/2022)         PAST SURGICAL HISTORY:  Past Surgical History:   Procedure Laterality Date     CIRCUMCISION      around 4 years  "of age     REPAIR VALVE AORTIC N/A 5/14/2020    Procedure: Median Sternotomy, Transesophagel Echocardiogram by Dr. Roa, REPAIR, AORTIC VALVE on Cardiopulmonary Bypass;  Surgeon: Griselli, Massimo, MD;  Location: [facility]       ALLERGIES  Dilantin [phenytoin]    FAMILY HX:  Family History   Problem Relation Age of Onset     Neurologic Disorder Paternal Grandmother         migraines       SOCIAL HX:  Social History     Socioeconomic History     Marital status: Single     Spouse name: None     Number of children: None     Years of education: None     Highest education level: None   Tobacco Use     Smoking status: Never     Smokeless tobacco: Never   Substance and Sexual Activity     Alcohol use: No     Drug use: No     Sexual activity: Never       ROS:  Constitutional: No fever, chills, or sweats. No weight gain/loss.   ENT: No visual disturbance, ear ache, epistaxis, sore throat.   Allergies/Immunologic: Negative.   Respiratory: No cough, hemoptysis.   Cardiovascular: As per HPI.   GI: No nausea, vomiting, hematemesis, melena, or hematochezia.   : No urinary frequency, dysuria, or hematuria.   Integument: Negative.   Psychiatric: Negative.   Neuro: Negative.   Endocrinology: Negative.   Musculoskeletal: No myalgia.    VITAL SIGNS:  /83 (BP Location: Right arm, Patient Position: Sitting, Cuff Size: Adult Large)   Pulse 79   Ht 1.901 m (6' 2.84\")   Wt 147.6 kg (325 lb 4.8 oz)   SpO2 98%   BMI 40.83 kg/m    Body mass index is 40.83 kg/m .  Wt Readings from Last 2 Encounters:   03/14/23 147.6 kg (325 lb 4.8 oz)   03/18/22 141.9 kg (312 lb 13.3 oz)       PHYSICAL EXAM  Martín Spencer IS A 22 year old male.in no acute distress.  HEENT: Unremarkable.  Neck: JVP normal.   Lungs: CTA.  Cor: RRR. Normal S1 and S2.  No murmur, rub, or gallop.  PMI in Lf 5th ICS.  Abd: Soft, nontender, nondistended.  Extremities: No C/C/E.  Neuro: Grossly intact.    LABS    Lab Results   Component Value Date    WBC 9.1 01/21/2023    " WBC 11.6 05/15/2020     Lab Results   Component Value Date    RBC 4.90 01/21/2023    RBC 4.17 05/15/2020     Lab Results   Component Value Date    HGB 14.1 01/21/2023    HGB 10.5 05/17/2020     Lab Results   Component Value Date    HCT 43.6 01/21/2023    HCT 36.1 05/15/2020     No components found for: MCT  Lab Results   Component Value Date    MCV 89 01/21/2023    MCV 87 05/15/2020     Lab Results   Component Value Date    MCH 28.8 01/21/2023    MCH 28.8 05/15/2020     Lab Results   Component Value Date    MCHC 32.3 01/21/2023    MCHC 33.2 05/15/2020     Lab Results   Component Value Date    RDW 12.1 01/21/2023    RDW 12.4 05/15/2020     Lab Results   Component Value Date     01/21/2023     05/15/2020      Recent Labs   Lab Test 01/21/23  1239 05/17/20  0600    139   POTASSIUM 4.4 3.8   CHLORIDE 103 104   CO2 25 32   ANIONGAP 11 3   GLC 88 84   BUN 10.4 12   CR 0.64* 0.68   KRYSTAL 9.2 8.6     Recent Labs   Lab Test 03/14/23  1328 03/16/16  1534   CHOL 171 120   HDL 45 39*   * 74   TRIG 103 37   NHDL 126 81        ASSESSMENT AND PLAN:  #1 Bicuspid aortic valve with severe AI, S/P repair 2020 Griselli - now with mild AI  #2 S/P gelweave graft size 28mm; his aorta was 3.1 CM on echo prior to replacement.  #3 Episode of chest pain with elevated CRP thought to be pericarditis 1/2023  #4 BMI 41     It was a pleasure to be involved in the care of Mr. Spencer. I reviewed his history and symptoms in detail and testing as outlined.  We discussed that his valve is doing well at this time. First degree relatives have been screened.  He does not need antibiotics before the dentist.  He should have dedicated imaging of his aorta so we will do that when convenient and we will also do an MRI given the elevated CRP and sed rate that has occurred repeatedly and we will also recheck them now.  We will check VD levels now as well as he has been low in the past and has not been on VD.  He will work on diet and  exercise.      It was a pleasure to see him. Please do not hesitate to contact me with questions.     JACQUELINE Myers MD   50 minutes face-face, documentation and review of records on day of visit

## 2023-03-14 NOTE — PATIENT INSTRUCTIONS
You were seen today in the Adult Congenital and Cardiovascular Genetics Clinic at the [facility].    Cardiology Providers you saw during your visit:  JACQUELINE Myers MD    Diagnosis:  Bicuspid aortic valve      Results:  JACQUELINE Myers MD reviewed the results of your EKG, echo and lab testing today in clinic.    Recommendations for you:    Complete a cardiac MRI/MRA      Cardiac MRI  Magnetic resonance imaging (MRI) is a test that lets your doctor see detailed pictures of the inside of your body. MRI combines the use of strong magnets and radio waves to form an MRI image. A Cardiac MRI will give a detailed image of your heart.  Follow these instructions:  1. Please no eating or drinking 3 hours prior to the procedure.   2. No caffeine, alcohol or tobacco 12 hours prior to the procedure.    During the procedure:  Please arrive to the Gold Waiting Room in the [facility].   You will be required to lay flat and follow breath-hold instructions.   You will need to remove all metal and answer a safety questionnaire. Please wear clothes without metal (snaps and zippers). Please remove any body piercings and hair extensions before you arrive. You will also remove watches, jewelry, hairpins, wallets, dentures, partial dental plates and hearing aids. You may wear contact lenses, and you may be able to wear your rings. We have a safe place to keep your personal items, but it is safer to leave them at home.  You will be given IV contrast for this exam.  To prepare:  The day before the exam drink extra fluid - at least six 8oz glasses (unless you are on a fluid restriction per your doctor)         General Cardiac Recommendations:  Continue to eat a heart healthy, low salt diet.  Continue to get 20-30 minutes of aerobic activity, 4-5 days per week.  Examples of aerobic activity include walking, running, swimming, cycling, etc.  Continue to observe good oral hygiene, with regular dental visits.      SBE  prophylaxis:   Yes___  No__x__      Exercise restrictions:   Yes__X__  No____         If yes, list restrictions:  Must be allowed to rest if fatigued or SOB      FASTING CHOLESTEROL was checked in the last 5 years YES__x_  NO___ (2023)      Follow-up:  Follow up with  March after imaging is complete    If you have questions or concerns please contact us at:    Mitzy Castro, RN, BSN   Dena Trejo (Scheduling)  Nurse Care Coordinator     Clinic   Adult Congenital and CV Genetics   Adult Congenital and CV Genetic  [facility]   [facility]  (P) [phone]     (P) [phone]            (F) [phone]        For after hours urgent needs, call [phone] and ask to speak to the Adult Congenital Physician on call.  Mention Job Code 0401.    For emergencies call 911.    [facility]  [facility]   Clinics and Surgery Center  Mail Code [id number]  [address] [place] [address]  [address]

## 2023-03-14 NOTE — NURSING NOTE
Cardiac Testing: Patient given instructions regarding CMRI/MRA. Discussed purpose, preparation, procedure and when to expect results reported back to the patient. Patient demonstrated understanding of this information and agreed to call with further questions or concerns.    Return Appointment: Patient given instructions regarding scheduling next clinic visit. Patient demonstrated understanding of this information and agreed to call with further questions or concerns.    Patient stated he understood all health information given and agreed to call with further questions or concerns.

## 2023-03-14 NOTE — LETTER
3/14/2023      RE: Martín Spencer  [address]  [place] [address]       Dear Colleague,    Thank you for the opportunity to participate in the care of your patient, Martín Spencer, at the [facility] [place] [facility]. Please see a copy of my visit note below.    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  He is seen today for follow-up of a bicuspid aortic valve that underwent repair for severe AI 5/14/2020 by Tracee and Patricio.  He also had ascending aorta replacement with Gelweave graft size 28mm; his aorta was 3.1 CM on echo.   He was first seen by cardiology in 2014 for evaluation of a murmur and was found to have a bicuspid aortic valve.  His brother also has a bicuspid aortic valve but has not needed to have anything done (he has 2 other brothers that are negative and his 2 parents are negative).  He has not had imaging of his aorta since aorta.  He had an echo today and he just had mild AI.  Aorta is not enlarged.     He states that he had an episode of chest pain after trying to crack a friend's back in January.  Inflammatory markers were elevated.  He was told that he had pericarditis and just took ibuprofen.  He states this happened one other time. He has not had fevers or chills or other concerning symptoms.  His CBC WBC was not elevated. He has gained some weight and is going to work on diet and exercise.     PAST MEDICAL HISTORY:  Past Medical History:   Diagnosis Date     Acanthosis nigricans 12/16/2013     Aortic insufficiency and aortic stenosis 12/11/2014     Aseptic meningitis      Bicuspid aortic valve 12/11/2014     Elevated liver enzymes 12/16/2013     History of bladder problems     Mom reports that he was around 4 yo and this led to him having a circumcision      Obesity 12/16/2013       CURRENT MEDICATIONS:  Current Outpatient  "Medications   Medication Sig Dispense Refill     losartan (COZAAR) 25 MG tablet Take 1 tablet (25 mg) by mouth daily 30 tablet 11     Melatonin 5 MG TBDP Pt takes 12 mg every night for sleep (Patient not taking: Reported on 3/18/2022)         PAST SURGICAL HISTORY:  Past Surgical History:   Procedure Laterality Date     CIRCUMCISION      around 4 years of age     REPAIR VALVE AORTIC N/A 5/14/2020    Procedure: Median Sternotomy, Transesophagel Echocardiogram by Dr. Roa, REPAIR, AORTIC VALVE on Cardiopulmonary Bypass;  Surgeon: Griselli, Massimo, MD;  Location: [facility] OR       ALLERGIES  Dilantin [phenytoin]    FAMILY HX:  Family History   Problem Relation Age of Onset     Neurologic Disorder Paternal Grandmother         migraines       SOCIAL HX:  Social History     Socioeconomic History     Marital status: Single     Spouse name: None     Number of children: None     Years of education: None     Highest education level: None   Tobacco Use     Smoking status: Never     Smokeless tobacco: Never   Substance and Sexual Activity     Alcohol use: No     Drug use: No     Sexual activity: Never       ROS:  Constitutional: No fever, chills, or sweats. No weight gain/loss.   ENT: No visual disturbance, ear ache, epistaxis, sore throat.   Allergies/Immunologic: Negative.   Respiratory: No cough, hemoptysis.   Cardiovascular: As per HPI.   GI: No nausea, vomiting, hematemesis, melena, or hematochezia.   : No urinary frequency, dysuria, or hematuria.   Integument: Negative.   Psychiatric: Negative.   Neuro: Negative.   Endocrinology: Negative.   Musculoskeletal: No myalgia.    VITAL SIGNS:  /83 (BP Location: Right arm, Patient Position: Sitting, Cuff Size: Adult Large)   Pulse 79   Ht 1.901 m (6' 2.84\")   Wt 147.6 kg (325 lb 4.8 oz)   SpO2 98%   BMI 40.83 kg/m    Body mass index is 40.83 kg/m .  Wt Readings from Last 2 Encounters:   03/14/23 147.6 kg (325 lb 4.8 oz)   03/18/22 141.9 kg (312 lb 13.3 oz)       PHYSICAL " EXAM  Martín Spencer IS A 22 year old male.in no acute distress.  HEENT: Unremarkable.  Neck: JVP normal.   Lungs: CTA.  Cor: RRR. Normal S1 and S2.  No murmur, rub, or gallop.  PMI in Lf 5th ICS.  Abd: Soft, nontender, nondistended.  Extremities: No C/C/E.  Neuro: Grossly intact.    LABS    Lab Results   Component Value Date    WBC 9.1 01/21/2023    WBC 11.6 05/15/2020     Lab Results   Component Value Date    RBC 4.90 01/21/2023    RBC 4.17 05/15/2020     Lab Results   Component Value Date    HGB 14.1 01/21/2023    HGB 10.5 05/17/2020     Lab Results   Component Value Date    HCT 43.6 01/21/2023    HCT 36.1 05/15/2020     No components found for: MCT  Lab Results   Component Value Date    MCV 89 01/21/2023    MCV 87 05/15/2020     Lab Results   Component Value Date    MCH 28.8 01/21/2023    MCH 28.8 05/15/2020     Lab Results   Component Value Date    MCHC 32.3 01/21/2023    MCHC 33.2 05/15/2020     Lab Results   Component Value Date    RDW 12.1 01/21/2023    RDW 12.4 05/15/2020     Lab Results   Component Value Date     01/21/2023     05/15/2020      Recent Labs   Lab Test 01/21/23  1239 05/17/20  0600    139   POTASSIUM 4.4 3.8   CHLORIDE 103 104   CO2 25 32   ANIONGAP 11 3   GLC 88 84   BUN 10.4 12   CR 0.64* 0.68   KRYSTAL 9.2 8.6     Recent Labs   Lab Test 03/14/23  1328 03/16/16  1534   CHOL 171 120   HDL 45 39*   * 74   TRIG 103 37   NHDL 126 81        ASSESSMENT AND PLAN:  #1 Bicuspid aortic valve with severe AI, S/P repair 2020 Griselli - now with mild AI  #2 S/P gelweave graft size 28mm; his aorta was 3.1 CM on echo prior to replacement.  #3 Episode of chest pain with elevated CRP thought to be pericarditis 1/2023  #4 BMI 41     It was a pleasure to be involved in the care of Mr. Spencer. I reviewed his history and symptoms in detail and testing as outlined.  We discussed that his valve is doing well at this time. First degree relatives have been screened.  He does not need  antibiotics before the dentist.  He should have dedicated imaging of his aorta so we will do that when convenient and we will also do an MRI given the elevated CRP and sed rate that has occurred repeatedly and we will also recheck them now.  We will check VD levels now as well as he has been low in the past and has not been on VD.  He will work on diet and exercise.      It was a pleasure to see him. Please do not hesitate to contact me with questions.     JACQUELINE Myers MD   50 minutes face-face, documentation and review of records on day of visit

## 2023-03-14 NOTE — NURSING NOTE
Chief Complaint   Patient presents with     New Patient     ACHD Visit Type: ACHD 3/14/2023: 22 year old male with history of bicuspid aortic valve. He subsequently underwent aortic valve repair and aortic root replacement by Dr. Massimo Griselli on 5/14/2020 presenting for evaluation.          Vitals were taken and medications reconciled.    was offered. Pt prefers english and does not require  services and  assisting will remove the note that an  is requested for future apts.      Sami Block, EMT   3:12 PM

## 2023-04-15 ENCOUNTER — HEALTH MAINTENANCE LETTER (OUTPATIENT)
Age: 23
End: 2023-04-15

## 2023-07-13 ENCOUNTER — HOSPITAL ENCOUNTER (OUTPATIENT)
Dept: MRI IMAGING | Facility: CLINIC | Age: 23
Discharge: HOME | End: 2023-07-13
Attending: INTERNAL MEDICINE
Payer: COMMERCIAL

## 2023-07-13 DIAGNOSIS — E55.9 VITAMIN D DEFICIENCY: ICD-10-CM

## 2023-07-13 DIAGNOSIS — Q23.81 BICUSPID AORTIC VALVE: ICD-10-CM

## 2023-07-13 DIAGNOSIS — Z95.2 S/P AORTIC VALVE REPLACEMENT: ICD-10-CM

## 2023-07-13 DIAGNOSIS — I35.2 AORTIC VALVE STENOSIS WITH INSUFFICIENCY, ETIOLOGY OF CARDIAC VALVE DISEASE UNSPECIFIED: ICD-10-CM

## 2023-07-13 DIAGNOSIS — E66.01 MORBID OBESITY (H): ICD-10-CM

## 2023-07-13 PROCEDURE — A9585 GADOBUTROL INJECTION: HCPCS | Mod: JZ | Performed by: INTERNAL MEDICINE

## 2023-07-13 PROCEDURE — 71555 MRI ANGIO CHEST W OR W/O DYE: CPT | Mod: 26 | Performed by: INTERNAL MEDICINE

## 2023-07-13 PROCEDURE — 71555 MRI ANGIO CHEST W OR W/O DYE: CPT

## 2023-07-13 PROCEDURE — 75565 CARD MRI VELOC FLOW MAPPING: CPT | Mod: 26 | Performed by: INTERNAL MEDICINE

## 2023-07-13 PROCEDURE — 255N000002 HC RX 255 OP 636: Mod: JZ | Performed by: INTERNAL MEDICINE

## 2023-07-13 PROCEDURE — 75561 CARDIAC MRI FOR MORPH W/DYE: CPT | Mod: 26 | Performed by: INTERNAL MEDICINE

## 2023-07-13 PROCEDURE — 75561 CARDIAC MRI FOR MORPH W/DYE: CPT

## 2023-07-13 RX ORDER — GADOBUTROL 604.72 MG/ML
15 INJECTION INTRAVENOUS ONCE
Status: COMPLETED | OUTPATIENT
Start: 2023-07-13 | End: 2023-07-13

## 2023-07-13 RX ADMIN — GADOBUTROL 15 ML: 604.72 INJECTION INTRAVENOUS at 08:51

## 2023-07-18 ENCOUNTER — OFFICE VISIT (OUTPATIENT)
Dept: CARDIOLOGY | Facility: CLINIC | Age: 23
End: 2023-07-18
Attending: INTERNAL MEDICINE
Payer: COMMERCIAL

## 2023-07-18 VITALS
HEART RATE: 71 BPM | BODY MASS INDEX: 39.17 KG/M2 | OXYGEN SATURATION: 98 % | HEIGHT: 75 IN | DIASTOLIC BLOOD PRESSURE: 70 MMHG | SYSTOLIC BLOOD PRESSURE: 119 MMHG | WEIGHT: 315 LBS

## 2023-07-18 DIAGNOSIS — Z95.2 S/P AORTIC VALVE REPLACEMENT: ICD-10-CM

## 2023-07-18 DIAGNOSIS — E55.9 VITAMIN D DEFICIENCY: ICD-10-CM

## 2023-07-18 DIAGNOSIS — I35.2 AORTIC VALVE STENOSIS WITH INSUFFICIENCY, ETIOLOGY OF CARDIAC VALVE DISEASE UNSPECIFIED: ICD-10-CM

## 2023-07-18 DIAGNOSIS — Q23.81 BICUSPID AORTIC VALVE: ICD-10-CM

## 2023-07-18 DIAGNOSIS — E66.01 MORBID OBESITY (H): ICD-10-CM

## 2023-07-18 PROCEDURE — G0463 HOSPITAL OUTPT CLINIC VISIT: HCPCS | Performed by: INTERNAL MEDICINE

## 2023-07-18 PROCEDURE — 99215 OFFICE O/P EST HI 40 MIN: CPT | Performed by: INTERNAL MEDICINE

## 2023-07-18 RX ORDER — VITAMIN B COMPLEX
1 TABLET ORAL DAILY
Qty: 90 TABLET | Refills: 3 | Status: SHIPPED | OUTPATIENT
Start: 2023-07-18

## 2023-07-18 RX ORDER — ERGOCALCIFEROL 1.25 MG/1
1 CAPSULE, LIQUID FILLED ORAL
Qty: 12 CAPSULE | Refills: 0 | Status: SHIPPED | OUTPATIENT
Start: 2023-07-20 | End: 2023-08-29

## 2023-07-18 ASSESSMENT — ENCOUNTER SYMPTOMS
DOUBLE VISION: 0
HYPOTENSION: 0
PALPITATIONS: 1
EYE IRRITATION: 1
LEG PAIN: 0
HYPERTENSION: 0
SKIN CHANGES: 1
NAIL CHANGES: 0
LIGHT-HEADEDNESS: 0
SYNCOPE: 0
EYE REDNESS: 0
EXERCISE INTOLERANCE: 0
EYE PAIN: 0
POOR WOUND HEALING: 0
ORTHOPNEA: 0
SLEEP DISTURBANCES DUE TO BREATHING: 0
EYE WATERING: 0

## 2023-07-18 ASSESSMENT — PAIN SCALES - GENERAL: PAINLEVEL: NO PAIN (0)

## 2023-07-18 NOTE — PATIENT INSTRUCTIONS
You were seen today in the Adult Congenital and Cardiovascular Genetics Clinic at the [facility].    Cardiology Providers you saw during your visit:  JACQUELINE Myers MD    Diagnosis:  BAV, s/p aortic valve repair    Results:  JACQUELINE Myers MD reviewed the results of your CMRI/MRA testing today in clinic.    Recommendations for you:    Start Vitamin D supplements      General Cardiac Recommendations:  Continue to eat a heart healthy, low salt diet.  Continue to get 20-30 minutes of aerobic activity, 4-5 days per week.  Examples of aerobic activity include walking, running, swimming, cycling, etc.  Continue to observe good oral hygiene, with regular dental visits.      SBE prophylaxis:   Yes____  No__X__    If YES is checked, follow the recommendations outlined below:  Take antibiotic(s) prior to recommended dental procedures and procedures on the respiratory tract or with infected skin, muscle or bones. SBE prophylaxis is not needed for routine GI and  procedures (ie. Colonoscopy or vaginal delivery)  Observe good oral hygiene daily, as advised by your dentist. Get regular professional dental care.  Keep cuts clean.  Infections should be treated promptly.  Symptoms of Infective Endocarditis could include: fever lasting more than 4-5 days or a recurrent fever that initially resolves but returns within 1-2 days)      Exercise restrictions:   Yes__X__  No____         If yes, list restrictions:  Must be allowed to rest if fatigued or SOB      FASTING CHOLESTEROL was checked in the last 5 years YES__X_  NO___ (2023)      Follow-up:  Follow up with Dr Myers in 1 year with an echo prior    If you have questions or concerns please contact us at:    Mitzy Castro, RN, BSN   Dena Trejo (Scheduling)  Nurse Care Coordinator     Clinic   Adult Congenital and CV Genetics   Adult Congenital and CV Genetic  [facility]   [facility]  (P)  [phone]     (P) [phone]            (F) [phone]        For after hours urgent needs, call [phone] and ask to speak to the Adult Congenital Physician on call.  Mention Job Code 0401.    For emergencies call 911.    [facility]  [facility]  Mail Code [id number]  [address] [place]  [address]

## 2023-07-18 NOTE — NURSING NOTE
Chief Complaint   Patient presents with     Follow Up     22 year old male with history of bicuspid aortic valve. He subsequently underwent aortic valve repair and aortic root replacement by Dr. Massimo Griselli on 5/14/2020 presenting for evaluation.       Vitals were taken, medications reconciled.    Radha Flores, ZAINAB   9:27 AM

## 2023-07-18 NOTE — PROGRESS NOTES
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  He has a history of a bicuspid aortic valve that underwent repair for severe AI 5/14/2020 by Tracee and Said.  He also had ascending aorta replacement with Gelweave graft size 28mm; his aorta was 3.1 CM on echo.   He was first seen by cardiology in 2014 for evaluation of a murmur and was found to have a bicuspid aortic valve.  His brother also has a bicuspid aortic valve but has not needed to have anything done (he has 2 other brothers that are negative and his 2 parents are negative).   He had an echo in the spring of 2023 and he just had mild AI.       He states that he had an episode of chest pain after trying to crack a friend's back in January 2023.  Inflammatory markers were elevated.  He was told that he had pericarditis and just took ibuprofen. He did have elevated inflammatory marker in 2014 as well with similar symptoms. We decided to do a cardiac MRI/MRA and he actually states that at the time of the study 7/2023 that he had the symtpoms again, of symptoms that were worse with breathing and positional. The MRI had no signs of inflammation and normal aortic dimension. He is headed back to college for his final year soon. He is feeling back to his normal self now.  He is vitamin D deficient. Has not started VD.    PAST MEDICAL HISTORY:  Past Medical History:   Diagnosis Date     Acanthosis nigricans 12/16/2013     Aortic insufficiency and aortic stenosis 12/11/2014     Aseptic meningitis      Bicuspid aortic valve 12/11/2014     Elevated liver enzymes 12/16/2013     History of bladder problems     Mom reports that he was around 4 yo and this led to him having a circumcision      Obesity 12/16/2013       CURRENT MEDICATIONS:  Current Outpatient Medications   Medication Sig Dispense Refill     losartan (COZAAR) 25 MG tablet Take 1 tablet (25 mg) by mouth daily 30 tablet 11     vitamin D3  "(CHOLECALCIFEROL) 50 mcg (2000 units) tablet Take 1 tablet (50 mcg) by mouth daily 90 tablet 3       PAST SURGICAL HISTORY:  Past Surgical History:   Procedure Laterality Date     CIRCUMCISION      around 4 years of age     REPAIR VALVE AORTIC N/A 5/14/2020    Procedure: Median Sternotomy, Transesophagel Echocardiogram by Dr. Roa, REPAIR, AORTIC VALVE on Cardiopulmonary Bypass;  Surgeon: Griselli, Massimo, MD;  Location: [facility]       ALLERGIES  Dilantin [phenytoin]    FAMILY HX:  Family History   Problem Relation Age of Onset     Neurologic Disorder Paternal Grandmother         migraines       SOCIAL HX:  Social History     Socioeconomic History     Marital status: Single   Tobacco Use     Smoking status: Never     Smokeless tobacco: Never   Substance and Sexual Activity     Alcohol use: No     Drug use: No     Sexual activity: Never       ROS:  Constitutional: No fever, chills, or sweats. No weight gain/loss.   ENT: No visual disturbance, ear ache, epistaxis, sore throat.   Allergies/Immunologic: Negative.   Respiratory: No cough, hemoptysis.   Cardiovascular: As per HPI.   GI: No nausea, vomiting, hematemesis, melena, or hematochezia.   : No urinary frequency, dysuria, or hematuria.   Integument: Negative.   Psychiatric: Negative.   Neuro: Negative.   Endocrinology: Negative.   Musculoskeletal: No myalgia.    VITAL SIGNS:  /70   Pulse 71   Ht 1.901 m (6' 2.84\")   Wt 149.7 kg (330 lb)   SpO2 98%   BMI 41.42 kg/m    Body mass index is 41.42 kg/m .  Wt Readings from Last 2 Encounters:   07/18/23 149.7 kg (330 lb)   03/14/23 147.6 kg (325 lb 4.8 oz)       PHYSICAL EXAM  Martín Spencer IS A 23 year old male.in no acute distress.  HEENT: Unremarkable.  Neck: JVP normal.  Extremities: No C/C/E.     LABS    Lab Results   Component Value Date    WBC 9.1 01/21/2023    WBC 11.6 05/15/2020     Lab Results   Component Value Date    RBC 4.90 01/21/2023    RBC 4.17 05/15/2020     Lab Results   Component Value Date "    HGB 14.1 01/21/2023    HGB 10.5 05/17/2020     Lab Results   Component Value Date    HCT 43.6 01/21/2023    HCT 36.1 05/15/2020     No components found for: MCT  Lab Results   Component Value Date    MCV 89 01/21/2023    MCV 87 05/15/2020     Lab Results   Component Value Date    MCH 28.8 01/21/2023    MCH 28.8 05/15/2020     Lab Results   Component Value Date    MCHC 32.3 01/21/2023    MCHC 33.2 05/15/2020     Lab Results   Component Value Date    RDW 12.1 01/21/2023    RDW 12.4 05/15/2020     Lab Results   Component Value Date     01/21/2023     05/15/2020      Recent Labs   Lab Test 01/21/23  1239 05/17/20  0600    139   POTASSIUM 4.4 3.8   CHLORIDE 103 104   CO2 25 32   ANIONGAP 11 3   GLC 88 84   BUN 10.4 12   CR 0.64* 0.68   KRYSTAL 9.2 8.6     Recent Labs   Lab Test 03/14/23  1328 03/16/16  1534   CHOL 171 120   HDL 45 39*   * 74   TRIG 103 37   NHDL 126 81        ASSESSMENT AND PLAN:  #1 Bicuspid aortic valve with severe AI, S/P repair 2020 Griselli - now with mild AI  #2 S/P gelweave graft size 28mm; his aorta was 3.1 CM on echo prior to replacement.  #3 Episode of chest pain with elevated CRP thought to be pericarditis 1/2023  #4 BMI 41   #5 VD deficiency     Had symptoms at the time of his MRI and the MRI was normal.   Discussed since his symptoms sound like could be lung doing CRP and sed rate and CBC and CT of the chest the next time he has symptoms.     Treating VD deficiency     Otherwise plan echo in a year.     It was a pleasure to see him. Please do not hesitate to contact me with questions.      JACQUELINE Myers MD   40 minutes face-face, documentation and review of records on day of visit

## 2023-07-18 NOTE — NURSING NOTE
Cardiac Testing: Patient given instructions regarding  echocardiogram . Discussed purpose, preparation, procedure and when to expect results reported back to the patient. Patient demonstrated understanding of this information and agreed to call with further questions or concerns.    Med Reconcile: Reviewed and verified all current medications with the patient. The updated medication list was printed and given to the patient.    New Medication: Patient was educated regarding newly prescribed medication, including discussion of  the indication, administration, side effects, and when to report to MD or RN. Patient demonstrated understanding of this information and agreed to call with further questions or concerns.    Return Appointment: Patient given instructions regarding scheduling next clinic visit. Patient demonstrated understanding of this information and agreed to call with further questions or concerns.    Patient stated he understood all health information given and agreed to call with further questions or concerns.

## 2023-07-18 NOTE — LETTER
7/18/2023      RE: Martín Spencer  [address]  [facility] [id number]       Dear Colleague,    Thank you for the opportunity to participate in the care of your patient, Martín Spencer, at the [facility] [place] [facility]. Please see a copy of my visit note below.    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  He has a history of a bicuspid aortic valve that underwent repair for severe AI 5/14/2020 by Tracee and Patricio.  He also had ascending aorta replacement with Gelweave graft size 28mm; his aorta was 3.1 CM on echo.   He was first seen by cardiology in 2014 for evaluation of a murmur and was found to have a bicuspid aortic valve.  His brother also has a bicuspid aortic valve but has not needed to have anything done (he has 2 other brothers that are negative and his 2 parents are negative).   He had an echo in the spring of 2023 and he just had mild AI.       He states that he had an episode of chest pain after trying to crack a friend's back in January 2023.  Inflammatory markers were elevated.  He was told that he had pericarditis and just took ibuprofen. He did have elevated inflammatory marker in 2014 as well with similar symptoms. We decided to do a cardiac MRI/MRA and he actually states that at the time of the study 7/2023 that he had the symtpoms again, of symptoms that were worse with breathing and positional. The MRI had no signs of inflammation and normal aortic dimension. He is headed back to college for his final year soon. He is feeling back to his normal self now.  He is vitamin D deficient. Has not started VD.    PAST MEDICAL HISTORY:  Past Medical History:   Diagnosis Date    Acanthosis nigricans 12/16/2013    Aortic insufficiency and aortic stenosis 12/11/2014    Aseptic meningitis     Bicuspid aortic valve 12/11/2014    Elevated liver  "enzymes 12/16/2013    History of bladder problems     Mom reports that he was around 4 yo and this led to him having a circumcision     Obesity 12/16/2013       CURRENT MEDICATIONS:  Current Outpatient Medications   Medication Sig Dispense Refill    losartan (COZAAR) 25 MG tablet Take 1 tablet (25 mg) by mouth daily 30 tablet 11    vitamin D3 (CHOLECALCIFEROL) 50 mcg (2000 units) tablet Take 1 tablet (50 mcg) by mouth daily 90 tablet 3       PAST SURGICAL HISTORY:  Past Surgical History:   Procedure Laterality Date    CIRCUMCISION      around 4 years of age    REPAIR VALVE AORTIC N/A 5/14/2020    Procedure: Median Sternotomy, Transesophagel Echocardiogram by Dr. Roa, REPAIR, AORTIC VALVE on Cardiopulmonary Bypass;  Surgeon: Griselli, Massimo, MD;  Location: [facility]       ALLERGIES  Dilantin [phenytoin]    FAMILY HX:  Family History   Problem Relation Age of Onset    Neurologic Disorder Paternal Grandmother         migraines       SOCIAL HX:  Social History     Socioeconomic History    Marital status: Single   Tobacco Use    Smoking status: Never    Smokeless tobacco: Never   Substance and Sexual Activity    Alcohol use: No    Drug use: No    Sexual activity: Never       ROS:  Constitutional: No fever, chills, or sweats. No weight gain/loss.   ENT: No visual disturbance, ear ache, epistaxis, sore throat.   Allergies/Immunologic: Negative.   Respiratory: No cough, hemoptysis.   Cardiovascular: As per HPI.   GI: No nausea, vomiting, hematemesis, melena, or hematochezia.   : No urinary frequency, dysuria, or hematuria.   Integument: Negative.   Psychiatric: Negative.   Neuro: Negative.   Endocrinology: Negative.   Musculoskeletal: No myalgia.    VITAL SIGNS:  /70   Pulse 71   Ht 1.901 m (6' 2.84\")   Wt 149.7 kg (330 lb)   SpO2 98%   BMI 41.42 kg/m    Body mass index is 41.42 kg/m .  Wt Readings from Last 2 Encounters:   07/18/23 149.7 kg (330 lb)   03/14/23 147.6 kg (325 lb 4.8 oz)       PHYSICAL EXAM  Martín" RAE Spencer IS A 23 year old male.in no acute distress.  HEENT: Unremarkable.  Neck: JVP normal.  Extremities: No C/C/E.     LABS    Lab Results   Component Value Date    WBC 9.1 01/21/2023    WBC 11.6 05/15/2020     Lab Results   Component Value Date    RBC 4.90 01/21/2023    RBC 4.17 05/15/2020     Lab Results   Component Value Date    HGB 14.1 01/21/2023    HGB 10.5 05/17/2020     Lab Results   Component Value Date    HCT 43.6 01/21/2023    HCT 36.1 05/15/2020     No components found for: MCT  Lab Results   Component Value Date    MCV 89 01/21/2023    MCV 87 05/15/2020     Lab Results   Component Value Date    MCH 28.8 01/21/2023    MCH 28.8 05/15/2020     Lab Results   Component Value Date    MCHC 32.3 01/21/2023    MCHC 33.2 05/15/2020     Lab Results   Component Value Date    RDW 12.1 01/21/2023    RDW 12.4 05/15/2020     Lab Results   Component Value Date     01/21/2023     05/15/2020      Recent Labs   Lab Test 01/21/23  1239 05/17/20  0600    139   POTASSIUM 4.4 3.8   CHLORIDE 103 104   CO2 25 32   ANIONGAP 11 3   GLC 88 84   BUN 10.4 12   CR 0.64* 0.68   KRYSTAL 9.2 8.6     Recent Labs   Lab Test 03/14/23  1328 03/16/16  1534   CHOL 171 120   HDL 45 39*   * 74   TRIG 103 37   NHDL 126 81        ASSESSMENT AND PLAN:  #1 Bicuspid aortic valve with severe AI, S/P repair 2020 Griselli - now with mild AI  #2 S/P gelweave graft size 28mm; his aorta was 3.1 CM on echo prior to replacement.  #3 Episode of chest pain with elevated CRP thought to be pericarditis 1/2023  #4 BMI 41   #5 VD deficiency     Had symptoms at the time of his MRI and the MRI was normal.   Discussed since his symptoms sound like could be lung doing CRP and sed rate and CBC and CT of the chest the next time he has symptoms.     Treating VD deficiency     Otherwise plan echo in a year.     It was a pleasure to see him. Please do not hesitate to contact me with questions.      JACQUELINE Myers MD   40 minutes face-face,  documentation and review of records on day of visit

## 2023-08-15 ENCOUNTER — TELEPHONE (OUTPATIENT)
Dept: CARDIOLOGY | Facility: CLINIC | Age: 23
End: 2023-08-15
Payer: COMMERCIAL

## 2023-08-15 NOTE — TELEPHONE ENCOUNTER
[facility] Call Center    Phone Message    May a detailed message be left on voicemail: yes     Reason for Call: Other: Pt has a dental appt today and was wondering if he needed to take antibiotics prior. Please call pt back to discuss.      Action Taken: Message routed to:  [facility] ([facility] cardiology    Travel Screening: Not Applicable      Thank you!  [facility]

## 2024-06-06 ENCOUNTER — APPOINTMENT (OUTPATIENT)
Dept: URBAN - METROPOLITAN AREA CLINIC 256 | Age: 24
Setting detail: DERMATOLOGY
End: 2024-06-06

## 2024-06-06 VITALS — WEIGHT: 315 LBS | HEIGHT: 75 IN

## 2024-06-06 DIAGNOSIS — L91.0 HYPERTROPHIC SCAR: ICD-10-CM

## 2024-06-06 DIAGNOSIS — Q819 OTHER SPECIFIED ANOMALIES OF SKIN: ICD-10-CM

## 2024-06-06 DIAGNOSIS — L73.0 ACNE KELOID: ICD-10-CM

## 2024-06-06 DIAGNOSIS — Q828 OTHER SPECIFIED ANOMALIES OF SKIN: ICD-10-CM

## 2024-06-06 DIAGNOSIS — Q826 OTHER SPECIFIED ANOMALIES OF SKIN: ICD-10-CM

## 2024-06-06 PROBLEM — L85.8 OTHER SPECIFIED EPIDERMAL THICKENING: Status: ACTIVE | Noted: 2024-06-06

## 2024-06-06 PROCEDURE — OTHER COUNSELING: OTHER

## 2024-06-06 PROCEDURE — OTHER OTC TREATMENT REGIMEN: OTHER

## 2024-06-06 PROCEDURE — OTHER PRESCRIPTION: OTHER

## 2024-06-06 PROCEDURE — OTHER PATIENT SPECIFIC COUNSELING: OTHER

## 2024-06-06 PROCEDURE — OTHER MIPS QUALITY: OTHER

## 2024-06-06 PROCEDURE — OTHER PRESCRIPTION MEDICATION MANAGEMENT: OTHER

## 2024-06-06 PROCEDURE — 99204 OFFICE O/P NEW MOD 45 MIN: CPT

## 2024-06-06 PROCEDURE — OTHER PHOTO-DOCUMENTATION: OTHER

## 2024-06-06 RX ORDER — FLUOCINONIDE 0.5 MG/ML
SOLUTION TOPICAL
Qty: 60 | Refills: 1 | Status: ERX | COMMUNITY
Start: 2024-06-06

## 2024-06-06 ASSESSMENT — LOCATION ZONE DERM
LOCATION ZONE: SCALP
LOCATION ZONE: ARM

## 2024-06-06 ASSESSMENT — LOCATION SIMPLE DESCRIPTION DERM
LOCATION SIMPLE: RIGHT UPPER ARM
LOCATION SIMPLE: POSTERIOR SCALP
LOCATION SIMPLE: LEFT UPPER ARM

## 2024-06-06 ASSESSMENT — LOCATION DETAILED DESCRIPTION DERM
LOCATION DETAILED: LEFT PROXIMAL POSTERIOR UPPER ARM
LOCATION DETAILED: MID-OCCIPITAL SCALP
LOCATION DETAILED: RIGHT PROXIMAL POSTERIOR UPPER ARM

## 2024-06-06 NOTE — HPI: SCAR (KELOIDS)
Is This A New Presentation, Or A Follow-Up?: Scars
Additional History: Started as pimple \\n\\n1 year ago \\n\\nTried at home remedies\\nRosemary shampoo and conditioner \\nBenzoyl peroxide \\nSpreading
Is This A New Presentation, Or A Follow-Up?: Scar
Additional History: The patient notes he has open heart surgery in 2020. Following surgery, he notes his skin was slow to heal the scars and caused a large keloid. Of note, the patient states the keloid has “improved” since the surgery. He is not currently on any treatment and has not been advised treatment. He seeks further evaluation and management.

## 2024-06-06 NOTE — PROCEDURE: PRESCRIPTION MEDICATION MANAGEMENT
Discontinue Regimen: At home treatments (loofa, oils)
Initiate Treatment: Fluocinonide 0.05% topical solution BID
Render In Strict Bullet Format?: No
Detail Level: Zone

## 2024-06-06 NOTE — PROCEDURE: PATIENT SPECIFIC COUNSELING
Advised patient to avoid clipping his hair close to the skin as this can exacerbate the condition. Advised patient to keep his hair at a longer length to promote healing. Discussed ILK injections are a possibility if the condition persists, but advised the topical steroid should reduce itching.
Detail Level: Zone
Advised patient to avoid irritating the skin with loofa or washcloth as these will cause his KP to worsen.
Recommended the patient apply silicone gel or silicone sheets to the scar to promote healing. Discussed the patient is susceptible to scarring and advised him to not get any piercings as he will likely form keloids again.

## 2024-06-06 NOTE — HPI: RASH
What Type Of Note Output Would You Prefer (Optional)?: Standard Output
How Severe Is Your Rash?: mild
Is This A New Presentation, Or A Follow-Up?: Rash
Additional History: The patient states he is currently using a Loofa to exfoliate the skin. He seeks evaluation and management.

## 2024-06-06 NOTE — PROCEDURE: OTC TREATMENT REGIMEN
Samples Given: Amlactin
Detail Level: Zone
Patient Specific Otc Recommendations (Will Not Stick From Patient To Patient): CeraVe SA followed by Amlactin

## 2024-06-16 ENCOUNTER — HEALTH MAINTENANCE LETTER (OUTPATIENT)
Age: 24
End: 2024-06-16

## 2025-06-21 ENCOUNTER — HEALTH MAINTENANCE LETTER (OUTPATIENT)
Age: 25
End: 2025-06-21

## 2025-07-09 DIAGNOSIS — Q23.81 BICUSPID AORTIC VALVE: ICD-10-CM

## 2025-07-09 DIAGNOSIS — Z95.2 S/P AORTIC VALVE REPLACEMENT: ICD-10-CM

## 2025-07-09 DIAGNOSIS — I35.2 AORTIC VALVE STENOSIS WITH INSUFFICIENCY, ETIOLOGY OF CARDIAC VALVE DISEASE UNSPECIFIED: Primary | ICD-10-CM

## 2025-07-16 DIAGNOSIS — I35.2 AORTIC VALVE STENOSIS WITH INSUFFICIENCY, ETIOLOGY OF CARDIAC VALVE DISEASE UNSPECIFIED: Primary | ICD-10-CM

## 2025-07-16 DIAGNOSIS — Q23.81 BICUSPID AORTIC VALVE: ICD-10-CM

## 2025-07-16 DIAGNOSIS — Z95.2 S/P AORTIC VALVE REPLACEMENT: ICD-10-CM

## (undated) DEVICE — NDL COUNTER 20CT 31142493

## (undated) DEVICE — GLOVE PROTEXIS BLUE W/NEU-THERA 8.0  2D73EB80

## (undated) DEVICE — LINEN TOWEL PACK X5 5464

## (undated) DEVICE — LINEN TOWEL PACK X30 5481

## (undated) DEVICE — SU VICRYL 0 CT-1 27" UND J260H

## (undated) DEVICE — PROTECTOR ARM ONE-STEP TRENDELENBURG 40418

## (undated) DEVICE — GLOVE ANSELL ENCORE MICRO 7 LATEX 5787003

## (undated) DEVICE — STRAP KNEE/BODY 31143004

## (undated) DEVICE — SU ETHILON 3-0 PS-1 18" 1663G

## (undated) DEVICE — SU VICRYL 1 CTX 36" J371H

## (undated) DEVICE — PACK ADULT HEART UMMC PV15CG92D

## (undated) DEVICE — COVER CAMERA IN-LIGHT DISP LT-C02

## (undated) DEVICE — SU ETHILON 2-0 PS 18" 585H

## (undated) DEVICE — DRSG PRIMAPORE 03 1/8X6" 66000318

## (undated) DEVICE — SU CARDIONYL 5-0 3/8 TAPER DA W/PLEDG 80CM 72106FH23

## (undated) DEVICE — DRSG STERI STRIP 1/2X4" R1547

## (undated) DEVICE — DRAPE LAP W/ARMBOARD 29410

## (undated) DEVICE — SU STEEL MYO/WIRE II STERNOTOMY 8 BE-1 3X14" 048-217

## (undated) DEVICE — SU PROLENE 2-0 RB-1DA 36" 8559H

## (undated) DEVICE — SU SILK 1 TIE 6X30" A307H

## (undated) DEVICE — SUCTION MANIFOLD DORNOCH ULTRA CART UL-CL500

## (undated) DEVICE — WIPE PAMPERS PREMOIST CLEANSING BABY SENSITIVE 17116

## (undated) DEVICE — LEAD ELEC MYOCARDIO PACING TEMPORARY 2-0 RB-1 24" TPW10

## (undated) DEVICE — SUCTION DRY CHEST DRAIN OASIS 3600-100

## (undated) DEVICE — DRSG PREVENA NEGATIVE PRESSURE 20CM WND PRE1001US

## (undated) DEVICE — LINEN DRAPE 54X72" 5467

## (undated) DEVICE — SU STEEL 6 CCS 4X18" M654G

## (undated) DEVICE — STERNUM SAW BLADE

## (undated) DEVICE — Device

## (undated) DEVICE — SU PROLENE 5-0 BBDA 36"  8580H

## (undated) DEVICE — SU MONOCRYL 3-0 PS-1 27" Y936H

## (undated) DEVICE — LINEN TOWEL PACK X6 WHITE 5487

## (undated) DEVICE — SPONGE LAP 18X18" X8435

## (undated) DEVICE — DRAPE SLUSH/WARMER 66X44" ORS-320

## (undated) DEVICE — WIPES FOLEY CARE SURESTEP PROVON DFC100

## (undated) DEVICE — SU PROLENE 4-0 SHDA 36" 8521H

## (undated) DEVICE — ESU ELEC BLADE 2.75" COATED/INSULATED E1455

## (undated) DEVICE — SU CARDIONYL 4-0 3/8 TAPER DA W/PLEDG 80CM 72177FH23

## (undated) DEVICE — GOWN LG DISP 9515

## (undated) DEVICE — SOL WATER IRRIG 1000ML BOTTLE 2F7114

## (undated) DEVICE — SU PROLENE 6-0 BVDA 30" 8776

## (undated) DEVICE — SOL NACL 0.9% IRRIG 1000ML BOTTLE 2F7124

## (undated) DEVICE — SU PROLENE 4-0 BBDA 36" 8581

## (undated) DEVICE — DRAIN CHEST TUBE 28FR STR 8028

## (undated) DEVICE — CONNECTOR DRAIN CHEST Y EXTENSION SET 19909

## (undated) DEVICE — ADPT 5 IN 1 360

## (undated) DEVICE — SU ETHIBOND 1 CTX CR 8/18" CX30D

## (undated) DEVICE — NDL ANGIOCATH 14GA 1.25" 4048

## (undated) DEVICE — DRAIN JACKSON PRATT CHANNEL 19FR ROUND HUBLESS SIL JP-2230

## (undated) RX ORDER — ACETAMINOPHEN 325 MG/1
TABLET ORAL
Status: DISPENSED
Start: 2020-05-14

## (undated) RX ORDER — FENTANYL CITRATE 50 UG/ML
INJECTION, SOLUTION INTRAMUSCULAR; INTRAVENOUS
Status: DISPENSED
Start: 2020-05-14

## (undated) RX ORDER — LIDOCAINE HYDROCHLORIDE 20 MG/ML
INJECTION, SOLUTION EPIDURAL; INFILTRATION; INTRACAUDAL; PERINEURAL
Status: DISPENSED
Start: 2020-05-14

## (undated) RX ORDER — ONDANSETRON 2 MG/ML
INJECTION INTRAMUSCULAR; INTRAVENOUS
Status: DISPENSED
Start: 2020-05-14

## (undated) RX ORDER — PROTAMINE SULFATE 10 MG/ML
INJECTION, SOLUTION INTRAVENOUS
Status: DISPENSED
Start: 2020-05-14

## (undated) RX ORDER — CEFAZOLIN SODIUM IN 0.9 % NACL 3 G/100 ML
INTRAVENOUS SOLUTION, PIGGYBACK (ML) INTRAVENOUS
Status: DISPENSED
Start: 2020-05-14

## (undated) RX ORDER — HYDROMORPHONE HYDROCHLORIDE 1 MG/ML
INJECTION, SOLUTION INTRAMUSCULAR; INTRAVENOUS; SUBCUTANEOUS
Status: DISPENSED
Start: 2020-05-14

## (undated) RX ORDER — GLYCOPYRROLATE 0.2 MG/ML
INJECTION, SOLUTION INTRAMUSCULAR; INTRAVENOUS
Status: DISPENSED
Start: 2020-05-14

## (undated) RX ORDER — HEPARIN SODIUM 1000 [USP'U]/ML
INJECTION, SOLUTION INTRAVENOUS; SUBCUTANEOUS
Status: DISPENSED
Start: 2020-05-14